# Patient Record
Sex: MALE | Race: WHITE | NOT HISPANIC OR LATINO | Employment: OTHER | URBAN - NONMETROPOLITAN AREA
[De-identification: names, ages, dates, MRNs, and addresses within clinical notes are randomized per-mention and may not be internally consistent; named-entity substitution may affect disease eponyms.]

---

## 2017-01-03 ENCOUNTER — OFFICE VISIT (OUTPATIENT)
Dept: INTERNAL MEDICINE | Facility: CLINIC | Age: 52
End: 2017-01-03

## 2017-01-03 VITALS
WEIGHT: 315 LBS | HEIGHT: 73 IN | DIASTOLIC BLOOD PRESSURE: 80 MMHG | BODY MASS INDEX: 41.75 KG/M2 | HEART RATE: 108 BPM | OXYGEN SATURATION: 98 % | TEMPERATURE: 98.6 F | SYSTOLIC BLOOD PRESSURE: 134 MMHG

## 2017-01-03 DIAGNOSIS — L98.9 SKIN LESION OF CHEST WALL: Primary | ICD-10-CM

## 2017-01-03 PROCEDURE — 99212 OFFICE O/P EST SF 10 MIN: CPT | Performed by: NURSE PRACTITIONER

## 2017-01-03 NOTE — MR AVS SNAPSHOT
Willi Rayo   1/3/2017 2:00 PM   Office Visit    Provider:  KAIT Abarca   Department:  [facility] PRIMARY CARE   Dept Phone:  [phone]                Your Full Care Plan              Today's Medication Changes          These changes are accurate as of: 1/3/17  3:28 PM.  If you have any questions, ask your nurse or doctor.               Stop taking medication(s)listed here:     benzonatate 100 MG capsule   Commonly known as:  TESSALKARON GASTONES                      Your Updated Medication List          This list is accurate as of: 1/3/17  3:28 PM.  Always use your most recent med list.                atorvastatin 40 MG tablet   Commonly known as:  LIPITOR   Take 1 tablet by mouth Daily.       cyclobenzaprine 10 MG tablet   Commonly known as:  FLEXERIL       ibuprofen 800 MG tablet   Commonly known as:  ADVIL,MOTRIN       meloxicam 15 MG tablet   Commonly known as:  MOBIC   Take 1 tablet by mouth Daily.       metoprolol succinate XL 50 MG 24 hr tablet   Commonly known as:  TOPROL-XL   Take 1 tablet by mouth Daily.       NITROSTAT 0.4 MG SL tablet   Generic drug:  nitroglycerin       SUMAtriptan 50 MG tablet   Commonly known as:  IMITREX   Take one tablet at onset of headache. May repeat dose one time in 2 hours if headache not relieved.               We Performed the Following     Ambulatory Referral to General Surgery       You Were Diagnosed With        Codes Comments    Skin lesion of chest wall    -  Primary ICD-10-CM: L98.9  ICD-9-CM: 709.9       Instructions     None    Patient Instructions History      Acc[employer]s Signup     [facility] [employer] allows you to send messages to your doctor, view your test results, renew your prescriptions, schedule appointments, and more. To sign up, go to [employer] and click on the Sign Up Now link in the New User? box. Enter your [employer] Activation Code exactly as it appears below along with the last four  "digits of your Social Security Number and your Date of Birth () to complete the sign-up process. If you do not sign up before the expiration date, you must request a new code.    [employer] Activation Code: [email or web address]  Expires: 2017  3:28 PM    If you have questions, you can email [email or web address] or call [phone] to talk to our [employer] staff. Remember, [employer] is NOT to be used for urgent needs. For medical emergencies, dial 911.               Other Info from Your Visit           Your Appointments     May 09, 2017  9:00 AM EDT   Follow Up with Paul Verduzco MD   [facility] PRIMARY CARE (--)    [address] [place] [address] [phone]           Arrive 15 minutes prior to appointment.              Allergies     Azithromycin      Penicillins      Prednisone        Reason for Visit     spot on chest infected spot on chest x 6 weeks      Vital Signs     Blood Pressure Pulse Temperature Height Weight Oxygen Saturation    134/80 108 98.6 °F (37 °C) 73\" (185.4 cm) 409 lb (186 kg) 98%    Body Mass Index Smoking Status                53.96 kg/m2 Never Smoker          Problems and Diagnoses Noted     Skin lesion of chest wall    -  Primary      "

## 2017-01-03 NOTE — PROGRESS NOTES
"Subjective   Willi Rayo is a 51 y.o. male.     History of Present Illness   Pt has an area on his chest that has been there for 6 weeks.  It started out as a bump.  It then scabbed over.  Every time the scab comes off, it will bleed a lot.  Drainage does not come out of it.  It will get larger and then the scab will come off and it will bleed.  The skin around it will get a little red.  It will get tender.  He denies fever.    Blood pressure 134/80, pulse 108, temperature 98.6 °F (37 °C), height 73\" (185.4 cm), weight (!) 409 lb (186 kg), SpO2 98 %.      The following portions of the patient's history were reviewed and updated as appropriate: allergies, current medications, past medical history and problem list.    Review of Systems  As noted in HPI.    Objective   Physical Exam   Constitutional: He is oriented to person, place, and time. He appears well-developed and well-nourished.   HENT:   Head: Normocephalic and atraumatic.   Neurological: He is alert and oriented to person, place, and time. No cranial nerve deficit.   Skin: Skin is warm and dry.        Psychiatric: He has a normal mood and affect. His behavior is normal. Judgment and thought content normal.   Nursing note and vitals reviewed.      Assessment/Plan   Willi was seen today for spot on chest.    Diagnoses and all orders for this visit:    Skin lesion of chest wall- referral to general surgery for removal.  Pt is agreeable.  No warmth to area.  Does not appear actively infected.  If symptoms worsen or do not improve, notify provider.  -     Ambulatory Referral to General Surgery      F/U PRN.         "

## 2017-01-26 ENCOUNTER — OFFICE VISIT (OUTPATIENT)
Dept: SURGERY | Facility: CLINIC | Age: 52
End: 2017-01-26

## 2017-01-26 VITALS
SYSTOLIC BLOOD PRESSURE: 124 MMHG | HEART RATE: 88 BPM | BODY MASS INDEX: 41.75 KG/M2 | DIASTOLIC BLOOD PRESSURE: 78 MMHG | HEIGHT: 73 IN | OXYGEN SATURATION: 96 % | WEIGHT: 315 LBS | TEMPERATURE: 97.6 F

## 2017-01-26 DIAGNOSIS — L98.9 DISORDER OF SKIN: Primary | ICD-10-CM

## 2017-01-26 PROCEDURE — 99202 OFFICE O/P NEW SF 15 MIN: CPT | Performed by: SURGERY

## 2017-01-26 NOTE — PROGRESS NOTES
LD Reyes MD  New Clinic Visit/ H&P    Willi Rayo        1965 01/26/2017  Paul Verduzco MD    Chief Complaint   Patient presents with   • Cyst     on chest that is tender and becoming larger per 6 weeks. Patient denies drainage and fever        started out as nodule there and keeps getting traumatized.  Will not heal up.  Past Medical History   Diagnosis Date   • Sinusitis      Past Surgical History   Procedure Laterality Date   • Vasectomy     • Mandible fracture closed reduction     • Coronary angioplasty Right      Allergies   Allergen Reactions   • Azithromycin    • Penicillins    • Prednisone      Family History   Problem Relation Age of Onset   • Heart disease Mother    • Heart disease Father    • Hyperlipidemia Father    • Hypertension Father    • Cancer Maternal Grandmother    • Stroke Maternal Grandmother      Social History     Social History   • Marital status:      Spouse name: N/A   • Number of children: N/A   • Years of education: N/A     Occupational History   • Not on file.     Social History Main Topics   • Smoking status: Never Smoker   • Smokeless tobacco: Not on file   • Alcohol use Not on file   • Drug use: Not on file   • Sexual activity: Not on file     Other Topics Concern   • Not on file     Social History Narrative     Review of Systems   Constitutional: Negative.  Unexpected weight change: gain.   HENT: Negative.    Eyes: Positive for redness and visual disturbance.   Respiratory: Negative.    Cardiovascular: Positive for leg swelling.   Gastrointestinal: Negative.    Endocrine: Negative.    Genitourinary: Positive for frequency.   Musculoskeletal: Positive for joint swelling and myalgias.   Skin: Negative.    Allergic/Immunologic: Negative.    Neurological: Negative.    Hematological: Negative.    Psychiatric/Behavioral: Negative.      Vitals:    01/26/17 1007   BP: 124/78   Pulse: 88   Temp: 97.6 °F (36.4 °C)   SpO2: 96%     Physical Exam   There is a 1.5 cm polypoid  nodule that is swollen and excoriated the base is about three fourths of a centimeter.  This appears to be a granuloma at this point.      Procedures     Assessment/Diagnosis  Large granuloma.  Additional:  Plan:    Scheduled for excision in the office.    15 minutes was spent face-to-face with patient counseling and discussing procedures     CORNELIA Reyes MD

## 2017-01-26 NOTE — MR AVS SNAPSHOT
[facility] GENERAL SURGERY  [phone]                    Willi Rayo   1/26/2017 10:30 AM   Office Visit    Dept Phone:  [phone]   Encounter #:  [id number]    Provider:  Phillip Reyes MD   Department:  [facility] GENERAL SURGERY                Your Full Care Plan              Your Updated Medication List          This list is accurate as of: 1/26/17 10:26 AM.  Always use your most recent med list.                atorvastatin 40 MG tablet   Commonly known as:  LIPITOR   Take 1 tablet by mouth Daily.       cyclobenzaprine 10 MG tablet   Commonly known as:  FLEXERIL       ibuprofen 800 MG tablet   Commonly known as:  ADVIL,MOTRIN       meloxicam 15 MG tablet   Commonly known as:  MOBIC   Take 1 tablet by mouth Daily.       metoprolol succinate XL 50 MG 24 hr tablet   Commonly known as:  TOPROL-XL   Take 1 tablet by mouth Daily.       NITROSTAT 0.4 MG SL tablet   Generic drug:  nitroglycerin       SUMAtriptan 50 MG tablet   Commonly known as:  IMITREX   Take one tablet at onset of headache. May repeat dose one time in 2 hours if headache not relieved.               Instructions     None    Patient Instructions History      Upcoming Appointments     Visit Type Date Time Department    NEW PATIENT 1/26/2017 10:30 AM [facility]    IN OFFICE PROCEDURE 1/31/2017 10:30 AM [facility]    FOLLOW UP 5/9/2017  9:00 AM OLYA ZEPEDA      MyChart Signup     Our records indicate that you have declined [facility] [employer] signup. If you would like to sign up for [employer], please email [email or web address] or call [phone] to obtain an activation code.             Other Info from Your Visit           Your Appointments     Jan 26, 2017 10:30 AM EST   New Patient with Phillip Reyes MD   [facility] GENERAL SURGERY (--)    [address] [place] [address] [phone]           Bring all previous medical  "records and films, along with current medications and insurance information.            Jan 31, 2017 10:30 AM EST   IN OFFICE PROCEDURE with Phillip Reyes MD   [facility] GENERAL SURGERY (--)    [address]  [facility] [address] [phone]           Bring medication list, test results, and radiology films that apply.            May 09, 2017  9:00 AM EDT   Follow Up with Paul Verduzco MD   [facility] PRIMARY CARE (--)    [address]  [facility] [address] [phone]           Arrive 15 minutes prior to appointment.              Allergies     Azithromycin      Penicillins      Prednisone        Reason for Visit     Cyst on chest that is tender and becoming larger per 6 weeks. Patient denies drainage and fever      Vital Signs     Blood Pressure Pulse Temperature Height Weight Oxygen Saturation    124/78 88 97.6 °F (36.4 °C) 73\" (185.4 cm) 408 lb (185 kg) 96%    Body Mass Index Smoking Status                53.83 kg/m2 Never Smoker            "

## 2017-01-31 ENCOUNTER — PROCEDURE VISIT (OUTPATIENT)
Dept: SURGERY | Facility: CLINIC | Age: 52
End: 2017-01-31

## 2017-01-31 VITALS
BODY MASS INDEX: 41.75 KG/M2 | HEART RATE: 85 BPM | OXYGEN SATURATION: 98 % | HEIGHT: 73 IN | WEIGHT: 315 LBS | TEMPERATURE: 97.9 F | SYSTOLIC BLOOD PRESSURE: 120 MMHG | DIASTOLIC BLOOD PRESSURE: 60 MMHG

## 2017-01-31 DIAGNOSIS — R22.2 MASS, CHEST: Primary | ICD-10-CM

## 2017-01-31 PROCEDURE — 11402 EXC TR-EXT B9+MARG 1.1-2 CM: CPT | Performed by: SURGERY

## 2017-02-08 ENCOUNTER — OFFICE VISIT (OUTPATIENT)
Dept: SURGERY | Facility: CLINIC | Age: 52
End: 2017-02-08

## 2017-02-08 VITALS
RESPIRATION RATE: 16 BRPM | TEMPERATURE: 98.4 F | DIASTOLIC BLOOD PRESSURE: 80 MMHG | BODY MASS INDEX: 41.75 KG/M2 | WEIGHT: 315 LBS | SYSTOLIC BLOOD PRESSURE: 120 MMHG | HEIGHT: 73 IN | HEART RATE: 83 BPM | OXYGEN SATURATION: 97 %

## 2017-02-08 DIAGNOSIS — Z09 FOLLOW UP: Primary | ICD-10-CM

## 2017-02-08 PROCEDURE — 99024 POSTOP FOLLOW-UP VISIT: CPT | Performed by: SURGERY

## 2017-02-08 NOTE — PROGRESS NOTES
ELISE Reyes MD  Follow-up/Clinic Visit    Willi Rayo        1965 02/08/2017  Paul Verduzco MD    Chief Complaint   Patient presents with   • Post-op Follow-up     s/p removal of cyst from chest wall   • Suture / Staple Removal   Patient had cyst removed from chest wall last visit. He is here today for suture removal and pathology results.     Allergies   Allergen Reactions   • Azithromycin    • Penicillins    • Prednisone      Vitals:    02/08/17 1340   BP: 120/80   Pulse: 83   Resp: 16   Temp: 98.4 °F (36.9 °C)   SpO2: 97%     Physical Exam   The wound is red and reactive and swollen and sutures of been pulled in as though it's been wet and not Covered.  Procedures   Removed sutures.  Stripped.  Care instructions and precautions given.  Assessment/Diagnosis  Pedunculated granuloma    Additional:  Plan:  Care instructions given and return as needed.  15 minutes was spent face-to-face with the patient counseling and discussing procedure on     CORNELIA Reyes MD

## 2017-05-10 ENCOUNTER — OFFICE VISIT (OUTPATIENT)
Dept: INTERNAL MEDICINE | Facility: CLINIC | Age: 52
End: 2017-05-10

## 2017-05-10 VITALS
RESPIRATION RATE: 14 BRPM | WEIGHT: 315 LBS | HEART RATE: 94 BPM | TEMPERATURE: 97.6 F | BODY MASS INDEX: 41.75 KG/M2 | OXYGEN SATURATION: 96 % | DIASTOLIC BLOOD PRESSURE: 82 MMHG | HEIGHT: 73 IN | SYSTOLIC BLOOD PRESSURE: 126 MMHG

## 2017-05-10 DIAGNOSIS — I25.10 CORONARY ARTERY DISEASE INVOLVING NATIVE CORONARY ARTERY WITHOUT ANGINA PECTORIS, UNSPECIFIED WHETHER NATIVE OR TRANSPLANTED HEART: ICD-10-CM

## 2017-05-10 DIAGNOSIS — Z12.11 SCREEN FOR COLON CANCER: ICD-10-CM

## 2017-05-10 DIAGNOSIS — E66.01 MORBID OBESITY, UNSPECIFIED OBESITY TYPE (HCC): ICD-10-CM

## 2017-05-10 DIAGNOSIS — G47.30 SLEEP APNEA, UNSPECIFIED TYPE: ICD-10-CM

## 2017-05-10 DIAGNOSIS — I10 BENIGN ESSENTIAL HYPERTENSION: Primary | ICD-10-CM

## 2017-05-10 PROCEDURE — 99214 OFFICE O/P EST MOD 30 MIN: CPT | Performed by: INTERNAL MEDICINE

## 2017-05-11 RX ORDER — METOPROLOL SUCCINATE 50 MG/1
TABLET, EXTENDED RELEASE ORAL
Qty: 30 TABLET | Refills: 0 | Status: SHIPPED | OUTPATIENT
Start: 2017-05-11 | End: 2017-12-14 | Stop reason: SDUPTHER

## 2017-05-17 ENCOUNTER — RESULTS ENCOUNTER (OUTPATIENT)
Dept: INTERNAL MEDICINE | Facility: CLINIC | Age: 52
End: 2017-05-17

## 2017-05-17 DIAGNOSIS — I25.10 CORONARY ARTERY DISEASE INVOLVING NATIVE CORONARY ARTERY WITHOUT ANGINA PECTORIS, UNSPECIFIED WHETHER NATIVE OR TRANSPLANTED HEART: ICD-10-CM

## 2017-06-19 ENCOUNTER — PREP FOR SURGERY (OUTPATIENT)
Dept: OTHER | Facility: HOSPITAL | Age: 52
End: 2017-06-19

## 2017-06-19 ENCOUNTER — HOSPITAL ENCOUNTER (OUTPATIENT)
Facility: HOSPITAL | Age: 52
Setting detail: HOSPITAL OUTPATIENT SURGERY
End: 2017-06-19
Attending: INTERNAL MEDICINE | Admitting: INTERNAL MEDICINE

## 2017-06-19 ENCOUNTER — OFFICE VISIT (OUTPATIENT)
Dept: GASTROENTEROLOGY | Facility: CLINIC | Age: 52
End: 2017-06-19

## 2017-06-19 VITALS
HEART RATE: 79 BPM | HEIGHT: 73 IN | WEIGHT: 315 LBS | DIASTOLIC BLOOD PRESSURE: 73 MMHG | RESPIRATION RATE: 16 BRPM | SYSTOLIC BLOOD PRESSURE: 119 MMHG | TEMPERATURE: 98 F | BODY MASS INDEX: 41.75 KG/M2

## 2017-06-19 DIAGNOSIS — Z12.11 COLON CANCER SCREENING: Primary | ICD-10-CM

## 2017-06-19 DIAGNOSIS — G47.30 SLEEP APNEA, UNSPECIFIED TYPE: Chronic | ICD-10-CM

## 2017-06-19 PROCEDURE — 99243 OFF/OP CNSLTJ NEW/EST LOW 30: CPT | Performed by: NURSE PRACTITIONER

## 2017-06-19 RX ORDER — SODIUM CHLORIDE 0.9 % (FLUSH) 0.9 %
1-10 SYRINGE (ML) INJECTION AS NEEDED
Status: CANCELLED | OUTPATIENT
Start: 2017-06-19

## 2017-06-19 RX ORDER — SODIUM CHLORIDE 9 MG/ML
70 INJECTION, SOLUTION INTRAVENOUS CONTINUOUS PRN
Status: CANCELLED | OUTPATIENT
Start: 2017-06-19

## 2017-06-19 NOTE — PROGRESS NOTES
"[address] [facility] [place] [address]    Chief Complaint   Patient presents with   • Colon Cancer Screening     History of Present Illness     The patient denies recent change in bowel habits. There is no diarrhea or constipation. There is no history of abdominal pain. There is no history of overt GI bleed (hematemesis melena or hematochezia). The patient denies nausea or vomiting. There is no history of reflux. The patient denies dysphagia or odynophagia. There is no history of recent significant weight loss. There is no history of liver disease in the past. There is no family history of colon cancer. The patient's last colonoscopy was in 2009 and was \"normal\" per patient.    The patient has a history of sleep apnea. He was diagnosed several years ago. He does not use CPAP as he could not get used to the machine.    Review of Systems   Constitutional: Negative for appetite change, chills, fatigue, fever and unexpected weight change.   HENT: Negative for mouth sores, nosebleeds and trouble swallowing.    Eyes: Negative for discharge and redness.   Respiratory: Positive for apnea. Negative for cough and shortness of breath.    Cardiovascular: Negative for chest pain, palpitations and leg swelling.   Gastrointestinal: Negative for abdominal distention, abdominal pain, anal bleeding, blood in stool, constipation, diarrhea, nausea and vomiting.   Endocrine: Negative for cold intolerance, heat intolerance and polydipsia.   Genitourinary: Negative for dysuria, hematuria and urgency.   Musculoskeletal: Positive for arthralgias. Negative for joint swelling and myalgias.   Skin: Negative for rash.   Allergic/Immunologic: Negative for food allergies and immunocompromised state.   Neurological: Negative for dizziness, seizures, syncope and headaches.   Hematological: Negative for adenopathy. Does not bruise/bleed easily.   Psychiatric/Behavioral: Negative for dysphoric mood. The patient is not nervous/anxious and is " not hyperactive.      Patient Active Problem List   Diagnosis   • Benign essential hypertension   • Migraine   • Morbid obesity   • Sleep apnea   • Screen for colon cancer   • Coronary artery disease involving native coronary artery without angina pectoris   • Colon cancer screening     Past Medical History:   Diagnosis Date   • Hyperlipidemia    • Hypertension    • Sinusitis    • Sleep apnea      Past Surgical History:   Procedure Laterality Date   • COLONOSCOPY  2009   • CORONARY ANGIOPLASTY Right    • MANDIBLE FRACTURE CLOSED REDUCTION     • UPPER GASTROINTESTINAL ENDOSCOPY  2009   • VASECTOMY       Family History   Problem Relation Age of Onset   • Heart disease Mother    • Heart disease Father    • Hyperlipidemia Father    • Hypertension Father    • Cancer Maternal Grandmother    • Stroke Maternal Grandmother    • Colon cancer Neg Hx      Social History   Substance Use Topics   • Smoking status: Never Smoker   • Smokeless tobacco: Never Used   • Alcohol use No      Comment: social       Current Outpatient Prescriptions:   •  atorvastatin (LIPITOR) 40 MG tablet, Take 1 tablet by mouth Daily., Disp: 90 tablet, Rfl: 3  •  cyclobenzaprine (FLEXERIL) 10 MG tablet, Take  by mouth., Disp: , Rfl:   •  ibuprofen (ADVIL,MOTRIN) 800 MG tablet, Take  by mouth every 8 (eight) hours., Disp: , Rfl:   •  meloxicam (MOBIC) 15 MG tablet, Take 1 tablet by mouth Daily., Disp: 90 tablet, Rfl: 3  •  metoprolol succinate XL (TOPROL-XL) 50 MG 24 hr tablet, Take 1 tablet by mouth Daily., Disp: 90 tablet, Rfl: 3  •  metoprolol succinate XL (TOPROL-XL) 50 MG 24 hr tablet, TAKE 1 TABLET BY MOUTH DAILY, Disp: 30 tablet, Rfl: 0  •  NITROSTAT 0.4 MG SL tablet, PLACE NOHELIA, Disp: , Rfl: 0  •  SUMAtriptan (IMITREX) 50 MG tablet, Take one tablet at onset of headache. May repeat dose one time in 2 hours if headache not relieved., Disp: 15 tablet, Rfl: 1    Allergies   Allergen Reactions   • Amoxicillin    • Azithromycin    • Penicillins    •  "Prednisone      /73  Pulse 79  Temp 98 °F (36.7 °C)  Resp 16  Ht 73\" (185.4 cm)  Wt (!) 416 lb (189 kg)  BMI 54.88 kg/m2    Physical Exam   Constitutional: He is oriented to person, place, and time. He appears well-developed and well-nourished. No distress.   HENT:   Head: Normocephalic and atraumatic.   Right Ear: Hearing and external ear normal.   Left Ear: Hearing and external ear normal.   Nose: Nose normal.   Mouth/Throat: Oropharynx is clear and moist and mucous membranes are normal. Mucous membranes are not pale, not dry and not cyanotic. No oral lesions. No oropharyngeal exudate.   Eyes: Conjunctivae and EOM are normal. Right eye exhibits no discharge. Left eye exhibits no discharge.   Neck: Trachea normal. Neck supple. No JVD present. No edema present. No thyroid mass and no thyromegaly present.   Cardiovascular: Normal rate, regular rhythm, S2 normal and normal heart sounds.  Exam reveals no gallop, no S3 and no friction rub.    No murmur heard.  Pulmonary/Chest: Effort normal and breath sounds normal. No respiratory distress. He exhibits no tenderness.   Abdominal: Normal appearance and bowel sounds are normal. He exhibits no distension, no ascites and no mass. There is no splenomegaly or hepatomegaly. There is no tenderness. There is no rigidity, no rebound and no guarding. No hernia.       Vascular Status -  His exam exhibits no right foot edema. His exam exhibits no left foot edema.  Lymphadenopathy:     He has no cervical adenopathy.        Left: No supraclavicular adenopathy present.   Neurological: He is alert and oriented to person, place, and time. He has normal strength. No cranial nerve deficit or sensory deficit.   Skin: No rash noted. He is not diaphoretic. No cyanosis. No pallor. Nails show no clubbing.   Psychiatric: He has a normal mood and affect.   Nursing note and vitals reviewed.  Stigmata of chronic liver disease:  None.  Asterixis:  None.    Laboratory Results:  Upon " review of records:    Dated 11/8/2016 glucose 86 BUN 15 creatinine 0.9 sodium 142 potassium 4.7 chloride 106 CO2 27 calcium 9.2 albumin 4.0 ALT 26 AST 20 alkaline phosphatase 91 total bilirubin 0.8 vitamin D 33.7 PSA 1.1     Assessment and Plan:      Willi was seen today for colon cancer screening.    Diagnoses and all orders for this visit:    Colon cancer screening  Comments:  Last colonoscopy was in 2009. No family history of colon cancer.    Sleep apnea, unspecified type  Comments:  History of long-standing sleep apnea. The patient does not use CPAP machine for sleep.        Plan  and Patient Instructions:  Patient Instructions   1. Colonoscopy: Description of the procedure, risks, benefits, alternatives and options, including nonoperative options, were discussed with the patient in detail. The patient understands and wishes to proceed.    Patient Care Team:  Paul Verduzco MD as PCP - General  Paul Verduzco MD as PCP - Family Medicine    KAIT Ren

## 2017-07-28 DIAGNOSIS — R74.8 ABNORMAL LIVER ENZYMES: Primary | ICD-10-CM

## 2017-07-28 LAB
ALBUMIN SERPL-MCNC: 4.2 G/DL (ref 3.5–5)
ALBUMIN/GLOB SERPL: 1.4 G/DL (ref 1–2)
ALP SERPL-CCNC: 82 U/L (ref 38–126)
ALT SERPL-CCNC: 91 U/L (ref 13–69)
AST SERPL-CCNC: 50 U/L (ref 15–46)
BILIRUB SERPL-MCNC: 0.9 MG/DL (ref 0.2–1.3)
BUN SERPL-MCNC: 19 MG/DL (ref 7–20)
BUN/CREAT SERPL: 17.3 (ref 6.3–21.9)
CALCIUM SERPL-MCNC: 9.7 MG/DL (ref 8.4–10.2)
CHLORIDE SERPL-SCNC: 102 MMOL/L (ref 98–107)
CHOLEST SERPL-MCNC: 102 MG/DL (ref 0–199)
CK SERPL-CCNC: 142 U/L (ref 30–170)
CO2 SERPL-SCNC: 26 MMOL/L (ref 26–30)
CREAT SERPL-MCNC: 1.1 MG/DL (ref 0.6–1.3)
GLOBULIN SER CALC-MCNC: 3 GM/DL
GLUCOSE SERPL-MCNC: 80 MG/DL (ref 74–98)
HDLC SERPL-MCNC: 27 MG/DL (ref 40–60)
LDLC SERPL CALC-MCNC: 62 MG/DL (ref 0–99)
POTASSIUM SERPL-SCNC: 4.4 MMOL/L (ref 3.5–5.1)
PROT SERPL-MCNC: 7.2 G/DL (ref 6.3–8.2)
SODIUM SERPL-SCNC: 141 MMOL/L (ref 137–145)
TRIGL SERPL-MCNC: 67 MG/DL
VLDLC SERPL CALC-MCNC: 13.4 MG/DL

## 2017-08-02 ENCOUNTER — RESULTS ENCOUNTER (OUTPATIENT)
Dept: INTERNAL MEDICINE | Facility: CLINIC | Age: 52
End: 2017-08-02

## 2017-08-02 DIAGNOSIS — R74.8 ABNORMAL LIVER ENZYMES: ICD-10-CM

## 2017-12-04 RX ORDER — ATORVASTATIN CALCIUM 40 MG/1
TABLET, FILM COATED ORAL
Qty: 90 TABLET | Refills: 0 | Status: SHIPPED | OUTPATIENT
Start: 2017-12-04 | End: 2018-03-04 | Stop reason: SDUPTHER

## 2017-12-15 RX ORDER — METOPROLOL SUCCINATE 50 MG/1
TABLET, EXTENDED RELEASE ORAL
Qty: 30 TABLET | Refills: 5 | Status: SHIPPED | OUTPATIENT
Start: 2017-12-15 | End: 2018-01-26

## 2018-01-26 ENCOUNTER — OFFICE VISIT (OUTPATIENT)
Dept: INTERNAL MEDICINE | Facility: CLINIC | Age: 53
End: 2018-01-26

## 2018-01-26 VITALS
DIASTOLIC BLOOD PRESSURE: 80 MMHG | HEART RATE: 91 BPM | BODY MASS INDEX: 41.75 KG/M2 | TEMPERATURE: 98.6 F | SYSTOLIC BLOOD PRESSURE: 110 MMHG | OXYGEN SATURATION: 99 % | HEIGHT: 73 IN | WEIGHT: 315 LBS

## 2018-01-26 DIAGNOSIS — M79.672 LEFT FOOT PAIN: Primary | ICD-10-CM

## 2018-01-26 DIAGNOSIS — M10.9 ACUTE GOUT OF LEFT FOOT, UNSPECIFIED CAUSE: ICD-10-CM

## 2018-01-26 PROBLEM — E79.0 ELEVATED BLOOD URIC ACID LEVEL: Status: ACTIVE | Noted: 2018-01-26

## 2018-01-26 PROCEDURE — 99214 OFFICE O/P EST MOD 30 MIN: CPT | Performed by: PHYSICIAN ASSISTANT

## 2018-01-26 RX ORDER — SUMATRIPTAN 50 MG/1
50 TABLET, FILM COATED ORAL ONCE AS NEEDED
Qty: 15 TABLET | Refills: 1 | Status: SHIPPED | OUTPATIENT
Start: 2018-01-26 | End: 2018-10-05 | Stop reason: SDUPTHER

## 2018-01-26 RX ORDER — IBUPROFEN 800 MG/1
800 TABLET ORAL EVERY 8 HOURS
Qty: 20 TABLET | Refills: 1 | Status: SHIPPED | OUTPATIENT
Start: 2018-01-26 | End: 2018-04-29

## 2018-01-26 NOTE — PROGRESS NOTES
Willi Rayo is a 52 y.o. male.     Subjective   History of Present Illness   Here today for follow up from ED visit last week (likely 1/15) due to left distal foot pain at the base of the 2nd-4th toes along with some redness.  Pain began the day he went to the ED and rapidly worsened. He took ibuprofen and Flexeril but pain did not improve so he went to [facility] ED where he had labs and x-ray. He notes that the pain improved some with putting his shoe on due to the pressure.   They told him that his uric acid level was slightly elevated.  He was given one dose of Vicodin and sent home. The next day the pain was considerably better but still sore to walk on.  The foot is still a little sore but much better than at onset.  No previous similar occurrences.  No warmth or edema at any time.  He has increased protein intake but no real increase in red meat intake. He has read meat about 1-2 times per week. He only consumes alcohol occasionally.  He has lost 26 pounds since his last office visit by decreasing carb intake, not eating past 6 pm and decreasing sugar intake.         The following portions of the patient's history were reviewed and updated as appropriate: allergies, current medications, past family history, past medical history, past social history, past surgical history and problem list.    Review of Systems    Constitutional: intentional weight loss. Negative for appetite change, chills, fatigue, fever and unexpected weight change.   HENT: Negative for congestion, ear pain, hearing loss, nosebleeds, postnasal drip, rhinorrhea, sore throat, tinnitus and trouble swallowing.    Eyes: Negative for photophobia, discharge and visual disturbance.   Respiratory: Negative for cough, chest tightness, shortness of breath and wheezing.    Cardiovascular: Negative for chest pain, palpitations and leg swelling.   Gastrointestinal: Negative for abdominal distention, abdominal pain, blood in stool, constipation,  "diarrhea, nausea and vomiting.   Endocrine: Negative for cold intolerance, heat intolerance, polydipsia, polyphagia and polyuria.   Musculoskeletal: left foot pain. Negative for back pain, joint swelling, myalgias, neck pain and neck stiffness.   Skin: left foot redness. Negative for pallor, rash and wound.   Allergic/Immunologic: Negative for environmental allergies, food allergies and immunocompromised state.   Neurological: Negative for dizziness, tremors, seizures, weakness, numbness and headaches.   Hematological: Negative for adenopathy. Does not bruise/bleed easily.   Psychiatric/Behavioral: Negative for sleep disturbances, agitation, behavioral problems, confusion, hallucinations, self-injury and suicidal ideas. The patient is not nervous/anxious.      Objective    Physical Exam  Constitutional: Oriented to person, place, and time. Appears well-developed and well-nourished.   HENT:   Head: Normocephalic and atraumatic.   Eyes: EOM are normal. Pupils are equal, round, and reactive to light.   Neck: Normal range of motion. Neck supple.   Cardiovascular: Normal rate, regular rhythm and normal heart sounds.    Pulmonary/Chest: Effort normal and breath sounds normal. No respiratory distress.  Has no wheezes or rales. Exhibits no chest wall tenderness.   Abdominal: Soft. Bowel sounds are normal. Exhibits no distension and no mass. There is no tenderness.   Musculoskeletal: Normal range of motion. Exhibits no tenderness.   Neurological: Alert and oriented to person, place, and time.   Skin: no erythema. Skin is warm and dry.   Psychiatric: Has a normal mood and affect. Behavior is normal. Judgment and thought content normal.       /80  Pulse 91  Temp 98.6 °F (37 °C)  Ht 185.4 cm (72.99\")  Wt (!) 177 kg (390 lb)  SpO2 99%  BMI 51.47 kg/m2    Nursing note and vitals reviewed.        Assessment/Plan   Willi was seen today for foot pain.    Diagnoses and all orders for this visit:    Left foot pain  -     " ibuprofen (ADVIL,MOTRIN) 800 MG tablet; Take 1 tablet by mouth Every 8 (Eight) Hours.    Gout left foot, acute  Improved since onset last week.   Likely gout despite abnormal area for pain with first occurrence of gout.   Uric Acid level elevated at 7.7 in ED.  ED record from [facility] reviewed.     Will not initiate treatment with Allopurinol since there have been no previous similar occurrences. Discussed dietary changes necessary to minimize chance of repeat episodes which he voiced understanding of.

## 2018-03-05 RX ORDER — ATORVASTATIN CALCIUM 40 MG/1
TABLET, FILM COATED ORAL
Qty: 90 TABLET | Refills: 0 | Status: SHIPPED | OUTPATIENT
Start: 2018-03-05 | End: 2018-06-11 | Stop reason: SDUPTHER

## 2018-04-29 ENCOUNTER — HOSPITAL ENCOUNTER (EMERGENCY)
Facility: HOSPITAL | Age: 53
Discharge: HOME | End: 2018-04-29
Attending: EMERGENCY MEDICINE | Admitting: EMERGENCY MEDICINE

## 2018-04-29 ENCOUNTER — APPOINTMENT (OUTPATIENT)
Dept: GENERAL RADIOLOGY | Facility: HOSPITAL | Age: 53
End: 2018-04-29

## 2018-04-29 VITALS
SYSTOLIC BLOOD PRESSURE: 128 MMHG | WEIGHT: 315 LBS | RESPIRATION RATE: 18 BRPM | DIASTOLIC BLOOD PRESSURE: 75 MMHG | BODY MASS INDEX: 41.75 KG/M2 | OXYGEN SATURATION: 97 % | HEART RATE: 95 BPM | TEMPERATURE: 98.6 F | HEIGHT: 73 IN

## 2018-04-29 DIAGNOSIS — S61.210A LACERATION OF RIGHT INDEX FINGER WITHOUT FOREIGN BODY WITHOUT DAMAGE TO NAIL, INITIAL ENCOUNTER: Primary | ICD-10-CM

## 2018-04-29 PROCEDURE — 90471 IMMUNIZATION ADMIN: CPT | Performed by: EMERGENCY MEDICINE

## 2018-04-29 PROCEDURE — 25010000002 TDAP 5-2.5-18.5 LF-MCG/0.5 SUSPENSION: Performed by: EMERGENCY MEDICINE

## 2018-04-29 PROCEDURE — 90715 TDAP VACCINE 7 YRS/> IM: CPT | Performed by: EMERGENCY MEDICINE

## 2018-04-29 PROCEDURE — 99283 EMERGENCY DEPT VISIT LOW MDM: CPT

## 2018-04-29 PROCEDURE — 73140 X-RAY EXAM OF FINGER(S): CPT

## 2018-04-29 RX ORDER — SULFAMETHOXAZOLE AND TRIMETHOPRIM 800; 160 MG/1; MG/1
1 TABLET ORAL 2 TIMES DAILY
Qty: 10 TABLET | Refills: 0 | Status: SHIPPED | OUTPATIENT
Start: 2018-04-29 | End: 2018-05-04

## 2018-04-29 RX ORDER — LIDOCAINE HYDROCHLORIDE 10 MG/ML
10 INJECTION, SOLUTION INFILTRATION; PERINEURAL ONCE
Status: DISCONTINUED | OUTPATIENT
Start: 2018-04-29 | End: 2018-04-29 | Stop reason: HOSPADM

## 2018-04-29 RX ORDER — BACITRACIN ZINC 500 [USP'U]/G
OINTMENT TOPICAL ONCE
Status: COMPLETED | OUTPATIENT
Start: 2018-04-29 | End: 2018-04-29

## 2018-04-29 RX ADMIN — TETANUS TOXOID, REDUCED DIPHTHERIA TOXOID AND ACELLULAR PERTUSSIS VACCINE, ADSORBED 0.5 ML: 5; 2.5; 8; 8; 2.5 SUSPENSION INTRAMUSCULAR at 18:34

## 2018-04-29 RX ADMIN — BACITRACIN ZINC: 500 OINTMENT TOPICAL at 19:36

## 2018-05-04 ENCOUNTER — OFFICE VISIT (OUTPATIENT)
Dept: INTERNAL MEDICINE | Facility: CLINIC | Age: 53
End: 2018-05-04

## 2018-05-04 VITALS
TEMPERATURE: 97.6 F | HEIGHT: 73 IN | OXYGEN SATURATION: 98 % | WEIGHT: 315 LBS | SYSTOLIC BLOOD PRESSURE: 130 MMHG | HEART RATE: 90 BPM | BODY MASS INDEX: 41.75 KG/M2 | RESPIRATION RATE: 14 BRPM | DIASTOLIC BLOOD PRESSURE: 80 MMHG

## 2018-05-04 DIAGNOSIS — S61.310A LACERATION OF RIGHT INDEX FINGER WITH DAMAGE TO NAIL, FOREIGN BODY PRESENCE UNSPECIFIED, INITIAL ENCOUNTER: Primary | ICD-10-CM

## 2018-05-04 PROCEDURE — 99213 OFFICE O/P EST LOW 20 MIN: CPT | Performed by: INTERNAL MEDICINE

## 2018-05-04 NOTE — PROGRESS NOTES
Subjective   Willi Rayo is a 52 y.o. male.     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 Still pain with certain position and sharp pain with touching. No fever or chills. Patient is also on Abx. Unable to work and need FMLA paper to be filled    Current Outpatient Prescriptions:   •  atorvastatin (LIPITOR) 40 MG tablet, TAKE 1 TABLET BY MOUTH DAILY, Disp: 90 tablet, Rfl: 0  •  meloxicam (MOBIC) 15 MG tablet, Take 1 tablet by mouth Daily., Disp: 90 tablet, Rfl: 3  •  metoprolol succinate XL (TOPROL-XL) 50 MG 24 hr tablet, Take 1 tablet by mouth Daily., Disp: 90 tablet, Rfl: 3  •  NITROSTAT 0.4 MG SL tablet, PLACE NOHELIA, Disp: , Rfl: 0  •  SUMAtriptan (IMITREX) 50 MG tablet, Take 1 tablet by mouth 1 (One) Time As Needed for Migraine for up to 1 dose., Disp: 15 tablet, Rfl: 1    The following portions of the patient's history were reviewed and updated as appropriate: allergies, current medications, past family history, past medical history, past social history, past surgical history and problem list.    Review of Systems   Constitutional: Negative.    Respiratory: Negative.    Cardiovascular: Negative.    Gastrointestinal: Negative.    Musculoskeletal: Negative.    Skin: Positive for wound.   Neurological: Negative.    Psychiatric/Behavioral: Negative.        Objective   Physical Exam   Constitutional: He is oriented to person, place, and time. He appears well-nourished.   Neck: Neck supple.   Cardiovascular: Normal rate, regular rhythm and normal heart sounds.    Pulmonary/Chest: Effort normal and breath sounds normal.   Abdominal: Bowel sounds are normal.   Neurological: He is alert and oriented to person, place, and time.   Skin: Skin is warm.   Right index finger stitches without overt swelling or erythema    Psychiatric:  He has a normal mood and affect.       All tests have been reviewed.    Assessment/Plan   Diagnoses and all orders for this visit:    Laceration of right index finger with damage to nail, foreign body presence unspecified, initial encounter             remova stitches in 12 days after suture. Continue present Mx.   Filled FMLA paper

## 2018-05-10 ENCOUNTER — CLINICAL SUPPORT (OUTPATIENT)
Dept: INTERNAL MEDICINE | Facility: CLINIC | Age: 53
End: 2018-05-10

## 2018-05-10 NOTE — PROGRESS NOTES
Patient came in to has stitches removed from his right index finger. Patient had 10 stitches removed.

## 2018-06-11 RX ORDER — ATORVASTATIN CALCIUM 40 MG/1
TABLET, FILM COATED ORAL
Qty: 90 TABLET | Refills: 1 | Status: SHIPPED | OUTPATIENT
Start: 2018-06-11 | End: 2019-01-02 | Stop reason: SDUPTHER

## 2018-06-28 RX ORDER — METOPROLOL SUCCINATE 50 MG/1
TABLET, EXTENDED RELEASE ORAL
Qty: 30 TABLET | Refills: 0 | Status: SHIPPED | OUTPATIENT
Start: 2018-06-28 | End: 2018-07-28 | Stop reason: SDUPTHER

## 2018-07-30 RX ORDER — METOPROLOL SUCCINATE 50 MG/1
TABLET, EXTENDED RELEASE ORAL
Qty: 30 TABLET | Refills: 0 | Status: SHIPPED | OUTPATIENT
Start: 2018-07-30 | End: 2018-08-30 | Stop reason: SDUPTHER

## 2018-08-30 RX ORDER — METOPROLOL SUCCINATE 50 MG/1
TABLET, EXTENDED RELEASE ORAL
Qty: 30 TABLET | Refills: 0 | Status: SHIPPED | OUTPATIENT
Start: 2018-08-30 | End: 2018-10-03 | Stop reason: SDUPTHER

## 2018-10-03 RX ORDER — METOPROLOL SUCCINATE 50 MG/1
TABLET, EXTENDED RELEASE ORAL
Qty: 30 TABLET | Refills: 1 | Status: SHIPPED | OUTPATIENT
Start: 2018-10-03 | End: 2018-10-05

## 2018-10-05 ENCOUNTER — OFFICE VISIT (OUTPATIENT)
Dept: INTERNAL MEDICINE | Facility: CLINIC | Age: 53
End: 2018-10-05

## 2018-10-05 VITALS
HEART RATE: 86 BPM | WEIGHT: 315 LBS | BODY MASS INDEX: 41.75 KG/M2 | HEIGHT: 73 IN | OXYGEN SATURATION: 95 % | DIASTOLIC BLOOD PRESSURE: 70 MMHG | SYSTOLIC BLOOD PRESSURE: 108 MMHG

## 2018-10-05 DIAGNOSIS — J06.9 ACUTE URI: Primary | ICD-10-CM

## 2018-10-05 PROCEDURE — 99213 OFFICE O/P EST LOW 20 MIN: CPT | Performed by: INTERNAL MEDICINE

## 2018-10-05 RX ORDER — IBUPROFEN 800 MG/1
TABLET ORAL
Refills: 1 | COMMUNITY
Start: 2018-07-01 | End: 2018-10-05 | Stop reason: SDUPTHER

## 2018-10-05 RX ORDER — ALBUTEROL SULFATE 90 UG/1
2 AEROSOL, METERED RESPIRATORY (INHALATION) EVERY 6 HOURS PRN
Qty: 1 INHALER | Refills: 1 | Status: SHIPPED | OUTPATIENT
Start: 2018-10-05 | End: 2019-07-02

## 2018-10-05 RX ORDER — IBUPROFEN 800 MG/1
800 TABLET ORAL EVERY 8 HOURS PRN
Qty: 90 TABLET | Refills: 5 | Status: SHIPPED | OUTPATIENT
Start: 2018-10-05 | End: 2019-01-04

## 2018-10-05 RX ORDER — SUMATRIPTAN 50 MG/1
50 TABLET, FILM COATED ORAL ONCE AS NEEDED
Qty: 15 TABLET | Refills: 1 | Status: SHIPPED | OUTPATIENT
Start: 2018-10-05 | End: 2021-11-19 | Stop reason: SDUPTHER

## 2018-10-05 RX ORDER — DOXYCYCLINE HYCLATE 100 MG/1
100 CAPSULE ORAL EVERY 12 HOURS SCHEDULED
Qty: 28 CAPSULE | Refills: 0 | Status: SHIPPED | OUTPATIENT
Start: 2018-10-05 | End: 2019-07-02

## 2018-10-05 NOTE — PROGRESS NOTES
Subjective   Willi Rayo is a 53 y.o. male.     53-year-old male patient presents to clinic today for cough that began 2.5 weeks ago.  Patient also mentions congestion and postnasal drip that began at the same time.  Patient says he feels a tickle in his throat and this causes him to cough  Patient also says he has coughing spells that last from 20-30 seconds to almost make him feel like he is going to pass out.  Patient denies shortness of breath.  Patient says over the past 2-1/2 weeks the cough has been slightly improving but the cough is worse at night, even waking him up during the night.  Patient says he doesn't cough much up.  Patient says he does have seasonal allergies but no allergy medication has helped him with his allergies.  Patient says he had some Tessalon Perles to help his cough but he is currently out of those.  Patient denies sore throat dysphasia nausea vomiting diarrhea.  Patient denies apneic episodes.  Patient denies smoking.      Cough   Associated symptoms include postnasal drip. Pertinent negatives include no chest pain, chills, fever, rhinorrhea, sore throat, shortness of breath or wheezing.        The following portions of the patient's history were reviewed and updated as appropriate: allergies, current medications, past family history, past medical history, past social history, past surgical history and problem list.    Review of Systems   Constitutional: Negative for chills, diaphoresis, fatigue and fever.   HENT: Positive for postnasal drip. Negative for rhinorrhea, sinus pain, sinus pressure, sneezing, sore throat and trouble swallowing.    Respiratory: Positive for cough. Negative for apnea, shortness of breath and wheezing.    Cardiovascular: Negative for chest pain.   Gastrointestinal: Negative for abdominal pain, anal bleeding, diarrhea, nausea and vomiting.   Genitourinary: Negative.        Objective   Physical Exam   Constitutional: He is oriented to person, place, and  time. He appears well-developed and well-nourished.   HENT:   Head: Normocephalic and atraumatic.   Cardiovascular: Normal rate, regular rhythm, normal heart sounds and intact distal pulses.    Pulmonary/Chest: Effort normal and breath sounds normal. No respiratory distress. He has no wheezes. He has no rales. He exhibits no tenderness.   Neurological: He is alert and oriented to person, place, and time.   Skin: Skin is warm.   Psychiatric: He has a normal mood and affect. His behavior is normal.       Assessment/Plan   Willi was seen today for cough.    Diagnoses and all orders for this visit:    Acute URI    Other orders  -     ibuprofen (ADVIL,MOTRIN) 800 MG tablet; Take 1 tablet by mouth Every 8 (Eight) Hours As Needed for Mild Pain .  -     SUMAtriptan (IMITREX) 50 MG tablet; Take 1 tablet by mouth 1 (One) Time As Needed for Migraine for up to 1 dose.  -     doxycycline (VIBRAMYCIN) 100 MG capsule; Take 1 capsule by mouth Every 12 (Twelve) Hours.  -     albuterol (PROVENTIL HFA;VENTOLIN HFA) 108 (90 Base) MCG/ACT inhaler; Inhale 2 puffs Every 6 (Six) Hours As Needed (cough).

## 2018-12-10 RX ORDER — METOPROLOL SUCCINATE 50 MG/1
TABLET, EXTENDED RELEASE ORAL
Qty: 30 TABLET | Refills: 0 | Status: SHIPPED | OUTPATIENT
Start: 2018-12-10 | End: 2019-01-04 | Stop reason: SDUPTHER

## 2019-01-02 RX ORDER — ATORVASTATIN CALCIUM 40 MG/1
TABLET, FILM COATED ORAL
Qty: 90 TABLET | Refills: 0 | Status: SHIPPED | OUTPATIENT
Start: 2019-01-02 | End: 2019-01-04 | Stop reason: SDUPTHER

## 2019-01-04 ENCOUNTER — OFFICE VISIT (OUTPATIENT)
Dept: INTERNAL MEDICINE | Facility: CLINIC | Age: 54
End: 2019-01-04

## 2019-01-04 VITALS
DIASTOLIC BLOOD PRESSURE: 84 MMHG | WEIGHT: 315 LBS | HEIGHT: 73 IN | TEMPERATURE: 97.6 F | BODY MASS INDEX: 41.75 KG/M2 | SYSTOLIC BLOOD PRESSURE: 156 MMHG | OXYGEN SATURATION: 98 % | HEART RATE: 96 BPM

## 2019-01-04 DIAGNOSIS — I25.10 CORONARY ARTERY DISEASE INVOLVING NATIVE CORONARY ARTERY WITHOUT ANGINA PECTORIS, UNSPECIFIED WHETHER NATIVE OR TRANSPLANTED HEART: ICD-10-CM

## 2019-01-04 DIAGNOSIS — M54.6 ACUTE RIGHT-SIDED THORACIC BACK PAIN: Primary | ICD-10-CM

## 2019-01-04 DIAGNOSIS — I10 BENIGN ESSENTIAL HYPERTENSION: ICD-10-CM

## 2019-01-04 DIAGNOSIS — M62.830 SPASM OF THORACIC BACK MUSCLE: ICD-10-CM

## 2019-01-04 PROCEDURE — 99214 OFFICE O/P EST MOD 30 MIN: CPT | Performed by: PHYSICIAN ASSISTANT

## 2019-01-04 RX ORDER — ATORVASTATIN CALCIUM 40 MG/1
40 TABLET, FILM COATED ORAL DAILY
Qty: 90 TABLET | Refills: 1 | Status: SHIPPED | OUTPATIENT
Start: 2019-01-04 | End: 2019-10-28 | Stop reason: SDUPTHER

## 2019-01-04 RX ORDER — TIZANIDINE HYDROCHLORIDE 4 MG/1
4 CAPSULE, GELATIN COATED ORAL 3 TIMES DAILY
Qty: 30 CAPSULE | Refills: 0 | Status: SHIPPED | OUTPATIENT
Start: 2019-01-04 | End: 2019-07-02

## 2019-01-04 RX ORDER — METOPROLOL SUCCINATE 50 MG/1
50 TABLET, EXTENDED RELEASE ORAL DAILY
Qty: 90 TABLET | Refills: 1 | Status: SHIPPED | OUTPATIENT
Start: 2019-01-04 | End: 2019-07-15 | Stop reason: SDUPTHER

## 2019-01-04 NOTE — PROGRESS NOTES
Subjective     Chief Complaint: back pain    History of Present Illness     Willi Rayo is a 53 y.o. male presenting following injuring his back last night moving a box. States he has had spasms similar to this in the past.  He took a baclofen tablet last night and received some benefit.  He has not tried any anti-inflammatories.  He denies any pain to lower back or radiating down legs, denies any weakness, numbness tingling or burning.  Denies any issues with bowel or bladder or saddle anesthesia.  Patient notes decreased range of motion, has a hard time bending over and standing back up.  He feels better if sitting still, then the pain is only a 2 or 3 out of 10.  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  Unsure if he will be able to work tomorrow due to the nature of his job and how much pain he is in.    Also requesting refills on his Lipitor and metoprolol.  BP is elevated today though likely due to patient's pain. Did take his medication this morning. He will monitor his BP at home. Has been running fine prior to hurting back. Denies any headache, CP, SOA, palpitations.    The following portions of the patient's history were reviewed and updated as appropriate: current medications, allergies, PMH.    Review of Systems   Constitutional: Negative for appetite change, chills, fatigue, fever and unexpected weight change.   HENT: Negative for congestion, ear pain, hearing loss, nosebleeds, sinus pressure, sore throat, tinnitus and trouble swallowing.    Eyes: Negative for photophobia, discharge and visual disturbance.   Respiratory: Negative for cough, chest tightness, shortness of breath and wheezing.    Cardiovascular: Negative for chest pain, palpitations and leg swelling.   Gastrointestinal: Negative for abdominal distention, abdominal pain, blood in stool, constipation, diarrhea, nausea and vomiting.   Endocrine: Negative for cold intolerance, heat  "intolerance, polydipsia, polyphagia and polyuria.   Genitourinary: Negative for difficulty urinating, dysuria, flank pain, frequency, hematuria and urgency.   Musculoskeletal: Positive for back pain. Negative for arthralgias, joint swelling, myalgias, neck pain and neck stiffness.   Skin: Negative for color change, pallor, rash and wound.   Allergic/Immunologic: Negative for environmental allergies, food allergies and immunocompromised state.   Neurological: Negative for dizziness, weakness, numbness and headaches.   Hematological: Negative for adenopathy. Does not bruise/bleed easily.   Psychiatric/Behavioral: Negative for dysphoric mood, hallucinations, sleep disturbance and suicidal ideas. The patient is not nervous/anxious.        Objective     Vitals:    01/04/19 1301 01/04/19 1403   BP: (!) 168/104 156/84   Pulse: 96    Temp: 97.6 °F (36.4 °C)    SpO2: 98%    Weight: (!) 193 kg (426 lb)    Height: 185.4 cm (72.99\")      Physical Exam   Constitutional: He is oriented to person, place, and time. He appears well-developed and well-nourished.   HENT:   Right Ear: Tympanic membrane normal.   Left Ear: Tympanic membrane normal.   Neck: Normal range of motion. Neck supple.   Cardiovascular: Normal rate and regular rhythm.   Pulmonary/Chest: Effort normal and breath sounds normal.   Abdominal: There is no CVA tenderness.   Musculoskeletal:        Thoracic back: He exhibits decreased range of motion, tenderness and spasm.        Lumbar back: He exhibits decreased range of motion, tenderness and spasm.   Neurological: He is alert and oriented to person, place, and time.   Psychiatric: He has a normal mood and affect.         Assessment/Plan     Diagnoses and all orders for this visit:    Acute right-sided thoracic back pain  -     TiZANidine (ZANAFLEX) 4 MG capsule; Take 1 capsule by mouth 3 (Three) Times a Day.  -     diclofenac (VOLTAREN) 50 MG EC tablet; Take 1 tablet by mouth 2 (Two) Times a Day As Needed " (pain).    Spasm of thoracic back muscle  -     TiZANidine (ZANAFLEX) 4 MG capsule; Take 1 capsule by mouth 3 (Three) Times a Day.  -     diclofenac (VOLTAREN) 50 MG EC tablet; Take 1 tablet by mouth 2 (Two) Times a Day As Needed (pain).    Heat TID. May supplement with tylenol. Consider PT if issue persists.    Benign essential hypertension  -     metoprolol succinate XL (TOPROL-XL) 50 MG 24 hr tablet; Take 1 tablet by mouth Daily.    Requested refill.  Monitor BP at home. Has been running okay but elevated today likely due to pain.    Coronary artery disease involving native coronary artery without angina pectoris, unspecified whether native or transplanted heart  -     atorvastatin (LIPITOR) 40 MG tablet; Take 1 tablet by mouth Daily.    Follow up with Dr. Verduzco as scheduled.    Kari Teresa PA-C  01/04/2019         Please note that portions of this note were completed with a voice recognition program. Efforts were made to edit dictation, but occasionally words are mistranscribed.

## 2019-01-10 RX ORDER — METOPROLOL SUCCINATE 50 MG/1
TABLET, EXTENDED RELEASE ORAL
Qty: 30 TABLET | Refills: 0 | OUTPATIENT
Start: 2019-01-10

## 2019-06-19 ENCOUNTER — TRANSCRIBE ORDERS (OUTPATIENT)
Dept: ADMINISTRATIVE | Facility: HOSPITAL | Age: 54
End: 2019-06-19

## 2019-06-19 DIAGNOSIS — S83.281A ACUTE LATERAL MENISCUS TEAR OF RIGHT KNEE, INITIAL ENCOUNTER: Primary | ICD-10-CM

## 2019-07-02 ENCOUNTER — OFFICE VISIT (OUTPATIENT)
Dept: INTERNAL MEDICINE | Facility: CLINIC | Age: 54
End: 2019-07-02

## 2019-07-02 ENCOUNTER — HOSPITAL ENCOUNTER (OUTPATIENT)
Dept: MRI IMAGING | Facility: HOSPITAL | Age: 54
Discharge: HOME | End: 2019-07-02
Admitting: ORTHOPAEDIC SURGERY

## 2019-07-02 VITALS
SYSTOLIC BLOOD PRESSURE: 130 MMHG | HEART RATE: 87 BPM | BODY MASS INDEX: 41.75 KG/M2 | WEIGHT: 315 LBS | OXYGEN SATURATION: 96 % | DIASTOLIC BLOOD PRESSURE: 70 MMHG | HEIGHT: 73 IN | TEMPERATURE: 98.7 F

## 2019-07-02 DIAGNOSIS — R11.2 NON-INTRACTABLE VOMITING WITH NAUSEA, UNSPECIFIED VOMITING TYPE: ICD-10-CM

## 2019-07-02 DIAGNOSIS — K59.00 CONSTIPATION, UNSPECIFIED CONSTIPATION TYPE: ICD-10-CM

## 2019-07-02 DIAGNOSIS — Z12.11 COLON CANCER SCREENING: ICD-10-CM

## 2019-07-02 DIAGNOSIS — K21.9 GASTROESOPHAGEAL REFLUX DISEASE, ESOPHAGITIS PRESENCE NOT SPECIFIED: Primary | ICD-10-CM

## 2019-07-02 DIAGNOSIS — S83.281A ACUTE LATERAL MENISCUS TEAR OF RIGHT KNEE, INITIAL ENCOUNTER: ICD-10-CM

## 2019-07-02 DIAGNOSIS — R10.13 EPIGASTRIC PAIN: ICD-10-CM

## 2019-07-02 PROCEDURE — 99213 OFFICE O/P EST LOW 20 MIN: CPT | Performed by: PHYSICIAN ASSISTANT

## 2019-07-02 PROCEDURE — 73721 MRI JNT OF LWR EXTRE W/O DYE: CPT

## 2019-07-02 RX ORDER — RANITIDINE 150 MG/1
150 TABLET ORAL 2 TIMES DAILY
Qty: 60 TABLET | Refills: 0 | Status: SHIPPED | OUTPATIENT
Start: 2019-07-02 | End: 2020-05-12

## 2019-07-02 RX ORDER — BISACODYL 5 MG/1
10 TABLET, DELAYED RELEASE ORAL DAILY PRN
Qty: 10 TABLET | Refills: 0 | Status: SHIPPED | OUTPATIENT
Start: 2019-07-02 | End: 2019-10-24

## 2019-07-02 RX ORDER — ONDANSETRON 4 MG/1
4 TABLET, FILM COATED ORAL EVERY 8 HOURS PRN
Qty: 21 TABLET | Refills: 0 | Status: SHIPPED | OUTPATIENT
Start: 2019-07-02 | End: 2021-11-19 | Stop reason: SDUPTHER

## 2019-07-02 NOTE — PROGRESS NOTES
Chief Complaint   Patient presents with   • GI Problem     patient states for aboutt 8 weeks he has been having some stomach pain, along with a lot of gas, patient states when he belches he does have some temporary relief.  Patient states he also has nausea off and on.       Subjective   Willi Rayo is a 53 y.o. male    History of Present Illness     GI problem:  Symptoms began about 8 weeks ago.  He describes sour stomach, gas, belching.  He reports that with belching there is some relief of the symptoms.  There is some associated nausea/vomiting.  The symptoms resolved for a few weeks, and then symptoms returned.  This has happened several times, where the symptoms will resolve and return a week or two later.  He has not noted any food triggers.  He denies any diarrhea.  Has had some mild constipation.  He reports that stools are not hard, but reports that he has a BM every 4 days.  He has tried OTC Tums without relief of symptoms.  He has had EGD in the past without problems.  He is due to have colonoscopy last year.  PMH of coronary artery disease.  He reports cardiac cath a few years ago.   Patient denies any chest pain with his associated symptoms.      Past Medical History:   Diagnosis Date   • Hyperlipidemia    • Hypertension    • Sinusitis    • Sleep apnea      Past Surgical History:   Procedure Laterality Date   • COLONOSCOPY  2009   • CORONARY ANGIOPLASTY Right    • MANDIBLE FRACTURE CLOSED REDUCTION     • UPPER GASTROINTESTINAL ENDOSCOPY  2009   • VASECTOMY       Family History   Problem Relation Age of Onset   • Heart disease Mother    • Heart disease Father    • Hyperlipidemia Father    • Hypertension Father    • Cancer Maternal Grandmother    • Stroke Maternal Grandmother    • Colon cancer Neg Hx      Social History     Socioeconomic History   • Marital status:      Spouse name: Not on file   • Number of children: Not on file   • Years of education: Not on file   • Highest education level:  Not on file   Tobacco Use   • Smoking status: Never Smoker   • Smokeless tobacco: Never Used   Substance and Sexual Activity   • Alcohol use: No     Comment: social   • Drug use: No   • Sexual activity: Defer     Allergies   Allergen Reactions   • Amoxicillin    • Azithromycin    • Penicillins    • Prednisone          Review of Systems   Constitutional: Negative for activity change, appetite change, chills, diaphoresis, fatigue, fever, unexpected weight gain and unexpected weight loss.   Respiratory: Negative for chest tightness and shortness of breath.    Cardiovascular: Negative for chest pain and leg swelling.   Gastrointestinal: Positive for abdominal distention (gas/bloating), constipation, nausea, GERD and indigestion. Negative for anal bleeding, blood in stool, diarrhea and vomiting.   Musculoskeletal: Negative for arthralgias, back pain, joint swelling and myalgias.   Neurological: Negative for weakness.     Objective     Vitals:    07/02/19 1435   BP: 130/70   Pulse: 87   Temp: 98.7 °F (37.1 °C)   SpO2: 96%       Physical Exam   Constitutional: He is oriented to person, place, and time. He appears well-developed and well-nourished. No distress.   Obese male, NAD.  Pleasant.    HENT:   Head: Normocephalic and atraumatic.   Eyes: Conjunctivae and EOM are normal. Pupils are equal, round, and reactive to light.   Neck: Normal range of motion. Neck supple.   Cardiovascular: Normal rate, regular rhythm, normal heart sounds and intact distal pulses. Exam reveals no gallop and no friction rub.   No murmur heard.  Pulmonary/Chest: Effort normal and breath sounds normal. No respiratory distress. He has no wheezes. He has no rales. He exhibits no tenderness.   Abdominal: Soft. Normal appearance. There is tenderness in the epigastric area. There is no rigidity, no rebound, no guarding, no tenderness at McBurney's point and negative Cisneros's sign.   Neurological: He is alert and oriented to person, place, and time.    Skin: Skin is warm and dry. Capillary refill takes less than 2 seconds. He is not diaphoretic.   Psychiatric: He has a normal mood and affect. His behavior is normal.   Nursing note and vitals reviewed.      Assessment/Plan     Willi was seen today for gi problem.    Diagnoses and all orders for this visit:    Gastroesophageal reflux disease, esophagitis presence not specified  -     raNITIdine (ZANTAC) 150 MG tablet; Take 1 tablet by mouth 2 (Two) Times a Day.    Constipation, unspecified constipation type  -     bisacodyl (DULCOLAX) 5 MG EC tablet; Take 2 tablets by mouth Daily As Needed for Constipation. Do not take any longer than 3 days.    Epigastric pain  -     Ambulatory Referral to General Surgery    Non-intractable vomiting with nausea, unspecified vomiting type  -     ondansetron (ZOFRAN) 4 MG tablet; Take 1 tablet by mouth Every 8 (Eight) Hours As Needed for Nausea or Vomiting.    Colon cancer screening  -     Ambulatory Referral to General Surgery    Start Zantac twice daily.  Miralax daily for constipation, ducolax if BM is not produced with miralax.  Increase water intake.  Avoid spicy foods, tomato based products, ect.  Zofran as needed.  Patient is past due for colonoscopy.  Will place referral for this.  Additionally, have advised patient with history of CAD that if symptoms are not improving with current plan, would have him RTC.  Consider referral to cardiology for stress test.  Patient agrees with plan and is in understanding.  He would like to try medications before a referral to cardiology or additional imaging is obtained.      Return if symptoms worsen or fail to improve, for Next scheduled follow up.    Payton Vuong PA-C

## 2019-07-12 ENCOUNTER — OFFICE VISIT (OUTPATIENT)
Dept: SURGERY | Facility: CLINIC | Age: 54
End: 2019-07-12

## 2019-07-12 VITALS
BODY MASS INDEX: 41.75 KG/M2 | HEIGHT: 73 IN | TEMPERATURE: 97.4 F | WEIGHT: 315 LBS | SYSTOLIC BLOOD PRESSURE: 130 MMHG | DIASTOLIC BLOOD PRESSURE: 78 MMHG | HEART RATE: 95 BPM | OXYGEN SATURATION: 98 %

## 2019-07-12 DIAGNOSIS — Z12.11 COLON CANCER SCREENING: ICD-10-CM

## 2019-07-12 DIAGNOSIS — R10.13 EPIGASTRIC PAIN: Primary | ICD-10-CM

## 2019-07-12 DIAGNOSIS — R10.11 RIGHT UPPER QUADRANT ABDOMINAL PAIN: ICD-10-CM

## 2019-07-12 PROCEDURE — 99204 OFFICE O/P NEW MOD 45 MIN: CPT | Performed by: SURGERY

## 2019-07-12 RX ORDER — BISACODYL 5 MG/1
TABLET, DELAYED RELEASE ORAL
Qty: 4 TABLET | Refills: 0 | Status: SHIPPED | OUTPATIENT
Start: 2019-07-12 | End: 2019-10-24

## 2019-07-12 NOTE — H&P (VIEW-ONLY)
Patient: Willi Rayo    YOB: 1965    Date: 07/12/2019    Primary Care Provider: Paul Verduzco MD    Reason for Consultation: EGD/ colon cancer screening    Chief Complaint   Patient presents with   • Abdominal Pain       Subjective .     History of present illness:  I saw the patient in the office today as a consultation for evaluation and treatment of epigastric pain, bloating, nausea, and vomiting for 3 months. Patient states episodes of nausea and bloating for several days followed by vomiting. Patient states after vomiting he does feel some relief however symptoms will return.  Last colonoscopy over 10 years ago, no history of colon cancer or polyps.  No rectal bleeding.  Significant fatty food intolerance and epigastric pain that is burning in nature.  Some relief with Zantac which he recently started.    The following portions of the patient's history were reviewed and updated as appropriate: allergies, current medications, past family history, past medical history, past social history, past surgical history and problem list.      Review of Systems   Constitutional: Negative for activity change, appetite change and chills.   HENT: Negative for congestion and hearing loss.    Respiratory: Negative for cough, choking, chest tightness and shortness of breath.    Cardiovascular: Negative for chest pain, palpitations and leg swelling.   Gastrointestinal: Positive for abdominal pain, nausea and vomiting.   Endocrine: Negative for cold intolerance and heat intolerance.   Genitourinary: Negative for dysuria, flank pain and frequency.   Musculoskeletal: Negative for back pain and myalgias.   Skin: Negative for color change and rash.   Neurological: Negative for seizures, facial asymmetry, light-headedness, numbness and headaches.   Psychiatric/Behavioral: Negative for agitation, behavioral problems and confusion.       History:  Past Medical History:   Diagnosis Date   • Hyperlipidemia    •  Hypertension    • Sinusitis    • Sleep apnea        Past Surgical History:   Procedure Laterality Date   • COLONOSCOPY  2009   • CORONARY ANGIOPLASTY Right    • MANDIBLE FRACTURE CLOSED REDUCTION     • UPPER GASTROINTESTINAL ENDOSCOPY  2009   • VASECTOMY         Family History   Problem Relation Age of Onset   • Heart disease Mother    • Heart disease Father    • Hyperlipidemia Father    • Hypertension Father    • Cancer Maternal Grandmother    • Stroke Maternal Grandmother    • Colon cancer Neg Hx        Social History     Tobacco Use   • Smoking status: Never Smoker   • Smokeless tobacco: Never Used   Substance Use Topics   • Alcohol use: No     Comment: social   • Drug use: No       Medications:    Current Outpatient Medications:   •  atorvastatin (LIPITOR) 40 MG tablet, Take 1 tablet by mouth Daily., Disp: 90 tablet, Rfl: 1  •  bisacodyl (DULCOLAX) 5 MG EC tablet, Take 2 tablets by mouth Daily As Needed for Constipation. Do not take any longer than 3 days., Disp: 10 tablet, Rfl: 0  •  bisacodyl (DULCOLAX) 5 MG EC tablet, Clear liquid diet day prior to colonoscopy. 2 at 3pm and 2 at 7pm., Disp: 4 tablet, Rfl: 0  •  diclofenac (VOLTAREN) 50 MG EC tablet, Take 1 tablet by mouth 2 (Two) Times a Day As Needed (pain)., Disp: 60 tablet, Rfl: 0  •  metoprolol succinate XL (TOPROL-XL) 50 MG 24 hr tablet, Take 1 tablet by mouth Daily., Disp: 90 tablet, Rfl: 1  •  NITROSTAT 0.4 MG SL tablet, PLACE NOHELIA, Disp: , Rfl: 0  •  ondansetron (ZOFRAN) 4 MG tablet, Take 1 tablet by mouth Every 8 (Eight) Hours As Needed for Nausea or Vomiting., Disp: 21 tablet, Rfl: 0  •  polyethylene glycol (MIRALAX) powder powder, Clear liquid diet day prior to colonoscopy. Mix with 64oz of clear liquid. Drink 8oz every 10-15 min until consumed., Disp: 238 g, Rfl: 0  •  raNITIdine (ZANTAC) 150 MG tablet, Take 1 tablet by mouth 2 (Two) Times a Day., Disp: 60 tablet, Rfl: 0  •  SUMAtriptan (IMITREX) 50 MG tablet, Take 1 tablet by mouth 1 (One) Time As  "Needed for Migraine for up to 1 dose., Disp: 15 tablet, Rfl: 1     Allergies:  Allergies   Allergen Reactions   • Amoxicillin    • Azithromycin    • Penicillins    • Prednisone        Objective     Vital Signs:   Vitals:    07/12/19 1440   BP: 130/78   Pulse: 95   Temp: 97.4 °F (36.3 °C)   SpO2: 98%   Weight: (!) 198 kg (437 lb)   Height: 185.4 cm (72.99\")       Physical Exam:   General Appearance:    Alert, cooperative, in no acute distress   Head:    Normocephalic, without obvious abnormality, atraumatic   Eyes:            Lids and lashes normal, conjunctivae and sclerae normal, no   icterus, no pallor, corneas clear, PERRL   Ears:    Ears appear intact with no abnormalities noted   Throat:   No oral lesions, no thrush, oral mucosa moist   Neck:   No adenopathy, supple, trachea midline, no thyromegaly,  no JVD   Lungs/respiratory:     Clear to auscultation,respirations regular, even and                  unlabored    Heart/cardiovascular:    Regular rhythm and normal rate, normal S1 and S2, no            murmur   Abdomen:     no masses, no organomegaly, soft with right upper quadrant epigastric tenderness.  No rebound or guarding.   Extremities:   Moves all extremities well, no edema, no cyanosis, no             redness   Pulses:   Pulses palpable and equal bilaterally   Skin:   No bleeding, bruising or rash   Lymph nodes:   No palpable adenopathy   Neurologic:   Cranial nerves 2 - 12 grossly intact, sensation intact   Psychiatric: No evidence of depression or anxiety      Results Review:   I reviewed the patient's new clinical results.    Review of Systems was reviewed and confirmed as accurate today.    Assessment/Plan :    1. Epigastric pain    2. Colon cancer screening    3. Right upper quadrant abdominal pain        I recommend a EGD and colonoscopy for further evaluation.  The procedure as well as the risks were explained which include but are not limited to bleeding, infection, intestinal perforation, " Aspiration etc. were explained and the patient understood all of the above and wishes to proceed with an EGD and colonoscopy.  Patient also will be scheduled for gallbladder ultrasound on follow-up visit.    Electronically signed by Sharon Ventura MD  07/12/19  2:41 PM

## 2019-07-15 DIAGNOSIS — I10 BENIGN ESSENTIAL HYPERTENSION: ICD-10-CM

## 2019-07-15 PROBLEM — R10.13 EPIGASTRIC PAIN: Status: ACTIVE | Noted: 2019-07-15

## 2019-07-15 RX ORDER — METOPROLOL SUCCINATE 50 MG/1
50 TABLET, EXTENDED RELEASE ORAL DAILY
Qty: 90 TABLET | Refills: 1 | Status: SHIPPED | OUTPATIENT
Start: 2019-07-15 | End: 2020-01-21

## 2019-07-16 ENCOUNTER — TELEPHONE (OUTPATIENT)
Dept: SURGERY | Facility: CLINIC | Age: 54
End: 2019-07-16

## 2019-07-18 ENCOUNTER — ANESTHESIA (OUTPATIENT)
Dept: GASTROENTEROLOGY | Facility: HOSPITAL | Age: 54
End: 2019-07-18

## 2019-07-18 ENCOUNTER — HOSPITAL ENCOUNTER (OUTPATIENT)
Facility: HOSPITAL | Age: 54
Setting detail: HOSPITAL OUTPATIENT SURGERY
Discharge: HOME | End: 2019-07-18
Attending: SURGERY | Admitting: SURGERY

## 2019-07-18 ENCOUNTER — ANESTHESIA EVENT (OUTPATIENT)
Dept: GASTROENTEROLOGY | Facility: HOSPITAL | Age: 54
End: 2019-07-18

## 2019-07-18 VITALS
SYSTOLIC BLOOD PRESSURE: 121 MMHG | BODY MASS INDEX: 41.75 KG/M2 | WEIGHT: 315 LBS | HEART RATE: 90 BPM | HEIGHT: 73 IN | TEMPERATURE: 97.3 F | OXYGEN SATURATION: 94 % | RESPIRATION RATE: 18 BRPM | DIASTOLIC BLOOD PRESSURE: 79 MMHG

## 2019-07-18 DIAGNOSIS — Z12.11 COLON CANCER SCREENING: ICD-10-CM

## 2019-07-18 DIAGNOSIS — R10.13 EPIGASTRIC PAIN: ICD-10-CM

## 2019-07-18 PROCEDURE — 25010000002 ONDANSETRON PER 1 MG: Performed by: NURSE ANESTHETIST, CERTIFIED REGISTERED

## 2019-07-18 PROCEDURE — 25010000002 PROPOFOL 200 MG/20ML EMULSION: Performed by: NURSE ANESTHETIST, CERTIFIED REGISTERED

## 2019-07-18 PROCEDURE — 25010000002 MIDAZOLAM PER 1 MG: Performed by: NURSE ANESTHETIST, CERTIFIED REGISTERED

## 2019-07-18 RX ORDER — MIDAZOLAM HYDROCHLORIDE 1 MG/ML
INJECTION INTRAMUSCULAR; INTRAVENOUS AS NEEDED
Status: DISCONTINUED | OUTPATIENT
Start: 2019-07-18 | End: 2019-07-18 | Stop reason: SURG

## 2019-07-18 RX ORDER — LIDOCAINE HYDROCHLORIDE 20 MG/ML
INJECTION, SOLUTION INTRAVENOUS AS NEEDED
Status: DISCONTINUED | OUTPATIENT
Start: 2019-07-18 | End: 2019-07-18 | Stop reason: SURG

## 2019-07-18 RX ORDER — SODIUM CHLORIDE, SODIUM LACTATE, POTASSIUM CHLORIDE, CALCIUM CHLORIDE 600; 310; 30; 20 MG/100ML; MG/100ML; MG/100ML; MG/100ML
1000 INJECTION, SOLUTION INTRAVENOUS CONTINUOUS
Status: DISCONTINUED | OUTPATIENT
Start: 2019-07-18 | End: 2019-07-18 | Stop reason: HOSPADM

## 2019-07-18 RX ORDER — ONDANSETRON 2 MG/ML
4 INJECTION INTRAMUSCULAR; INTRAVENOUS ONCE AS NEEDED
Status: DISCONTINUED | OUTPATIENT
Start: 2019-07-18 | End: 2019-07-18 | Stop reason: HOSPADM

## 2019-07-18 RX ORDER — GLYCOPYRROLATE 0.2 MG/ML
INJECTION INTRAMUSCULAR; INTRAVENOUS AS NEEDED
Status: DISCONTINUED | OUTPATIENT
Start: 2019-07-18 | End: 2019-07-18 | Stop reason: SURG

## 2019-07-18 RX ORDER — KETAMINE HCL IN NACL, ISO-OSM 100MG/10ML
SYRINGE (ML) INJECTION AS NEEDED
Status: DISCONTINUED | OUTPATIENT
Start: 2019-07-18 | End: 2019-07-18 | Stop reason: SURG

## 2019-07-18 RX ORDER — MAGNESIUM HYDROXIDE 1200 MG/15ML
LIQUID ORAL AS NEEDED
Status: DISCONTINUED | OUTPATIENT
Start: 2019-07-18 | End: 2019-07-18 | Stop reason: HOSPADM

## 2019-07-18 RX ORDER — PROPOFOL 10 MG/ML
INJECTION, EMULSION INTRAVENOUS AS NEEDED
Status: DISCONTINUED | OUTPATIENT
Start: 2019-07-18 | End: 2019-07-18 | Stop reason: SURG

## 2019-07-18 RX ORDER — ONDANSETRON 2 MG/ML
INJECTION INTRAMUSCULAR; INTRAVENOUS AS NEEDED
Status: DISCONTINUED | OUTPATIENT
Start: 2019-07-18 | End: 2019-07-18 | Stop reason: SURG

## 2019-07-18 RX ADMIN — SODIUM CHLORIDE, POTASSIUM CHLORIDE, SODIUM LACTATE AND CALCIUM CHLORIDE 1000 ML: 600; 310; 30; 20 INJECTION, SOLUTION INTRAVENOUS at 11:17

## 2019-07-18 RX ADMIN — PROPOFOL 50 MG: 10 INJECTION, EMULSION INTRAVENOUS at 12:30

## 2019-07-18 RX ADMIN — PROPOFOL 100 MG: 10 INJECTION, EMULSION INTRAVENOUS at 12:19

## 2019-07-18 RX ADMIN — LIDOCAINE HYDROCHLORIDE 60 MG: 20 INJECTION, SOLUTION INTRAVENOUS at 12:19

## 2019-07-18 RX ADMIN — ONDANSETRON 4 MG: 2 INJECTION INTRAMUSCULAR; INTRAVENOUS at 12:41

## 2019-07-18 RX ADMIN — Medication 10 MG: at 12:19

## 2019-07-18 RX ADMIN — GLYCOPYRROLATE 0.1 MG: 0.2 INJECTION, SOLUTION INTRAMUSCULAR; INTRAVENOUS at 12:11

## 2019-07-18 RX ADMIN — MIDAZOLAM HYDROCHLORIDE 2 MG: 1 INJECTION, SOLUTION INTRAMUSCULAR; INTRAVENOUS at 12:19

## 2019-07-18 RX ADMIN — PROPOFOL 100 MG: 10 INJECTION, EMULSION INTRAVENOUS at 12:26

## 2019-07-18 RX ADMIN — PROPOFOL 50 MG: 10 INJECTION, EMULSION INTRAVENOUS at 12:35

## 2019-07-18 RX ADMIN — PROPOFOL 50 MG: 10 INJECTION, EMULSION INTRAVENOUS at 12:23

## 2019-07-18 NOTE — ANESTHESIA PREPROCEDURE EVALUATION
Anesthesia Evaluation     Patient summary reviewed and Nursing notes reviewed   NPO Solid Status: > 8 hours  NPO Liquid Status: > 8 hours           Airway   Mallampati: II  TM distance: >3 FB  Neck ROM: full  No difficulty expected  Dental - normal exam     Pulmonary - normal exam   (+) sleep apnea (non-compliant with CPAP) on CPAP,   (-) not a smoker  Cardiovascular - normal exam  Exercise tolerance: poor (<4 METS)    (+) hypertension well controlled less than 2 medications, CAD, hyperlipidemia,     ROS comment: Cardiomyopathy     Neuro/Psych  (+) headaches (migraines ),     GI/Hepatic/Renal/Endo    (+) obesity, morbid obesity, GERD poorly controlled,      Musculoskeletal     Abdominal    Substance History      OB/GYN          Other   (+) arthritis     ROS/Med Hx Other: RLS                  Anesthesia Plan    ASA 3     MAC   (Risks and benefits discussed including risk of aspiration, recall and dental damage. All patient questions answered. Will continue with POC.)  intravenous induction   Anesthetic plan, all risks, benefits, and alternatives have been provided, discussed and informed consent has been obtained with: patient.

## 2019-07-18 NOTE — ANESTHESIA POSTPROCEDURE EVALUATION
Patient: Willi Rayo    Procedure Summary     Date:  07/18/19 Room / Location:  [facility] ENDOSCOPY 3 / [facility] ENDOSCOPY    Anesthesia Start:  1212 Anesthesia Stop:  1254    Procedures:       ESOPHAGOGASTRODUODENOSCOPY WITH BIOPSY (N/A Esophagus)      COLONOSCOPY WITH HOT BIOPSY POLYPECTOMY, HOT SNARE POLYPECTOMY (N/A ) Diagnosis:       Epigastric pain      Colon cancer screening      (Epigastric pain [R10.13])      (Colon cancer screening [Z12.11])    Surgeon:  Sharon Ventura MD Provider:  Jessy Bran CRNA    Anesthesia Type:  MAC ASA Status:  3          Anesthesia Type: MAC  Last vitals  BP   121/79 (07/18/19 1325)   Temp   97.3 °F (36.3 °C) (07/18/19 1255)   Pulse   90 (07/18/19 1325)   Resp   18 (07/18/19 1325)     SpO2   94 % (07/18/19 1325)     Post Anesthesia Care and Evaluation    Patient location during evaluation: PHASE II  Patient participation: complete - patient participated  Level of consciousness: awake and alert  Pain score: 0  Pain management: satisfactory to patient  Airway patency: patent  Anesthetic complications: No anesthetic complications  PONV Status: none  Cardiovascular status: acceptable and stable  Respiratory status: acceptable  Hydration status: acceptable

## 2019-07-22 ENCOUNTER — OFFICE VISIT (OUTPATIENT)
Dept: SURGERY | Facility: CLINIC | Age: 54
End: 2019-07-22

## 2019-07-22 VITALS
HEART RATE: 85 BPM | DIASTOLIC BLOOD PRESSURE: 82 MMHG | HEIGHT: 73 IN | SYSTOLIC BLOOD PRESSURE: 132 MMHG | BODY MASS INDEX: 41.75 KG/M2 | OXYGEN SATURATION: 99 % | WEIGHT: 315 LBS | TEMPERATURE: 98.9 F

## 2019-07-22 DIAGNOSIS — R10.11 RIGHT UPPER QUADRANT ABDOMINAL PAIN: ICD-10-CM

## 2019-07-22 DIAGNOSIS — R10.13 EPIGASTRIC PAIN: Primary | ICD-10-CM

## 2019-07-22 DIAGNOSIS — K21.00 GASTROESOPHAGEAL REFLUX DISEASE WITH ESOPHAGITIS: ICD-10-CM

## 2019-07-22 PROCEDURE — 99213 OFFICE O/P EST LOW 20 MIN: CPT | Performed by: SURGERY

## 2019-07-22 NOTE — PROGRESS NOTES
Patient: Willi Rayo    YOB: 1965    Date: 07/22/2019    Primary Care Provider: Paul Verduzco MD    Reason for Consultation: Follow-up EGD    Chief Complaint   Patient presents with   • Follow-up     EGD and colon       History of present illness:  I saw the patient in the office today as a followup from their recent EGD with biopsy and colonoscopy, the pathology report did show reactive gastropathy wild mild nonspecific chronic inflammation, squamous mucosa and tubular adenoma.  They state that they have done well and have no complaints.  Patient was complaining of right upper quadrant pain, nausea and fatty food intolerance.  Some relief with Zantac.  He started that 2 weeks ago.  Ultrasound the gallbladder today shows small amount of sludge but no stones or inflammation.    The following portions of the patient's history were reviewed and updated as appropriate: allergies, current medications, past family history, past medical history, past social history, past surgical history and problem list.      Review of Systems   Constitutional: Negative for chills, fever and unexpected weight change.   HENT: Negative for trouble swallowing and voice change.    Eyes: Negative for visual disturbance.   Respiratory: Negative for apnea, cough, chest tightness, shortness of breath and wheezing.    Cardiovascular: Negative for chest pain, palpitations and leg swelling.   Gastrointestinal: Negative for abdominal distention, abdominal pain, anal bleeding, blood in stool, constipation, diarrhea, nausea, rectal pain and vomiting.   Endocrine: Negative for cold intolerance and heat intolerance.   Genitourinary: Negative for difficulty urinating, dysuria, flank pain, scrotal swelling and testicular pain.   Musculoskeletal: Negative for back pain, gait problem and joint swelling.   Skin: Negative for color change, rash and wound.   Neurological: Negative for dizziness, syncope, speech difficulty, weakness, numbness  "and headaches.   Hematological: Negative for adenopathy. Does not bruise/bleed easily.   Psychiatric/Behavioral: Negative for confusion. The patient is not nervous/anxious.        Vital Signs:   Vitals:    07/22/19 1247   BP: 132/82   Pulse: 85   Temp: 98.9 °F (37.2 °C)   SpO2: 99%   Weight: (!) 197 kg (435 lb)   Height: 185.4 cm (72.99\")       Allergies:  Allergies   Allergen Reactions   • Amoxicillin Shortness Of Breath   • Azithromycin Shortness Of Breath   • Cephalosporins Shortness Of Breath   • Penicillins Shortness Of Breath   • Prednisone Irritability       Medications:    Current Outpatient Medications:   •  atorvastatin (LIPITOR) 40 MG tablet, Take 1 tablet by mouth Daily., Disp: 90 tablet, Rfl: 1  •  bisacodyl (DULCOLAX) 5 MG EC tablet, Take 2 tablets by mouth Daily As Needed for Constipation. Do not take any longer than 3 days., Disp: 10 tablet, Rfl: 0  •  bisacodyl (DULCOLAX) 5 MG EC tablet, Clear liquid diet day prior to colonoscopy. 2 at 3pm and 2 at 7pm., Disp: 4 tablet, Rfl: 0  •  diclofenac (VOLTAREN) 50 MG EC tablet, Take 1 tablet by mouth 2 (Two) Times a Day As Needed (pain)., Disp: 60 tablet, Rfl: 0  •  metoprolol succinate XL (TOPROL-XL) 50 MG 24 hr tablet, Take 1 tablet by mouth Daily., Disp: 90 tablet, Rfl: 1  •  NITROSTAT 0.4 MG SL tablet, PLACE NOHELIA, Disp: , Rfl: 0  •  ondansetron (ZOFRAN) 4 MG tablet, Take 1 tablet by mouth Every 8 (Eight) Hours As Needed for Nausea or Vomiting., Disp: 21 tablet, Rfl: 0  •  polyethylene glycol (MIRALAX) powder powder, Clear liquid diet day prior to colonoscopy. Mix with 64oz of clear liquid. Drink 8oz every 10-15 min until consumed., Disp: 238 g, Rfl: 0  •  raNITIdine (ZANTAC) 150 MG tablet, Take 1 tablet by mouth 2 (Two) Times a Day., Disp: 60 tablet, Rfl: 0  •  SUMAtriptan (IMITREX) 50 MG tablet, Take 1 tablet by mouth 1 (One) Time As Needed for Migraine for up to 1 dose., Disp: 15 tablet, Rfl: 1    Physical Exam:   General Appearance:    Alert, " cooperative, in no acute distress   Abdomen:     no masses, no organomegaly, soft and tender in the epigastric region without guarding or rebound.   Chest:      Clear toausculation            Cor:  Regular rate and rhythm      Results Review:   I reviewed the patient's new clinical results.    Assessment / Plan:    1. Epigastric pain    2. Right upper quadrant abdominal pain    3. Gastroesophageal reflux disease with esophagitis        I did discuss the situation with the patient today in the office and they have done well from their recent EGD with biopsy. I have told the patient to continue Zantac twice a day to alleviate his symptoms of reflux.  If no improvement, will consider HIDA scan to rule out a calculus cholecystitis due to find the sludge on gallbladder ultrasound.  Patient will need repeat colonoscopy in 5 years..    Electronically signed by Sharon Ventura MD  07/22/19      Portions of this note has been scribed for Sharon Ventura MD by Chikis Albarran. 7/22/2019  1:15 PM

## 2019-07-24 LAB
LAB AP CASE REPORT: NORMAL
PATH REPORT.FINAL DX SPEC: NORMAL

## 2019-09-10 ENCOUNTER — OFFICE VISIT (OUTPATIENT)
Dept: INTERNAL MEDICINE | Facility: CLINIC | Age: 54
End: 2019-09-10

## 2019-09-10 VITALS
HEART RATE: 83 BPM | DIASTOLIC BLOOD PRESSURE: 86 MMHG | WEIGHT: 315 LBS | BODY MASS INDEX: 41.75 KG/M2 | SYSTOLIC BLOOD PRESSURE: 130 MMHG | TEMPERATURE: 98.9 F | HEIGHT: 73 IN | OXYGEN SATURATION: 96 %

## 2019-09-10 DIAGNOSIS — Z13.220 SCREENING, LIPID: ICD-10-CM

## 2019-09-10 DIAGNOSIS — Z13.0 SCREENING FOR ENDOCRINE, METABOLIC AND IMMUNITY DISORDER: ICD-10-CM

## 2019-09-10 DIAGNOSIS — R07.89 CHEST DISCOMFORT: Primary | ICD-10-CM

## 2019-09-10 DIAGNOSIS — Z12.5 SCREENING FOR PROSTATE CANCER: ICD-10-CM

## 2019-09-10 DIAGNOSIS — Z13.29 SCREENING FOR ENDOCRINE, METABOLIC AND IMMUNITY DISORDER: ICD-10-CM

## 2019-09-10 DIAGNOSIS — Z13.228 SCREENING FOR ENDOCRINE, METABOLIC AND IMMUNITY DISORDER: ICD-10-CM

## 2019-09-10 DIAGNOSIS — G47.33 OSA (OBSTRUCTIVE SLEEP APNEA): ICD-10-CM

## 2019-09-10 DIAGNOSIS — Z13.0 SCREENING FOR DISORDER OF BLOOD AND BLOOD-FORMING ORGANS: ICD-10-CM

## 2019-09-10 DIAGNOSIS — R53.83 FATIGUE, UNSPECIFIED TYPE: ICD-10-CM

## 2019-09-10 PROCEDURE — 99214 OFFICE O/P EST MOD 30 MIN: CPT | Performed by: NURSE PRACTITIONER

## 2019-09-10 NOTE — PROGRESS NOTES
"Date: 09/10/2019    Name: Willi Rayo  : 1965    Chief Complaint:   Chief Complaint   Patient presents with   • Chest Pain       HPI:  Mr Rayo woke up at 0230 this morning with dull pain in the center of his chest.  Pain did not radiate to either arm, back, jaw.  He took both nitroglycerin and Tums.  Pain was alleviated to an acceptable level within 5 minutes.  He has had cardiac chest pain in the past, states this was different from previous episodes.  Denies diaphoresis, nausea, lightheadedness, confusion, dizziness during episode at 0230 this morning.  He is accompanied by his significant other, who reports that Mr. Belcher has been eating later in the evening and complaining of GI upset after meals in recent weeks.  Mr. Belcher is also been snoring more loudly, waking himself up snorting more often. Describes very vivid dreams.  He has become increasingly fatigued, with daytime drowsiness.  Reports if he sits down, he falls asleep.  He was previously assessed for obstructive sleep apnea, prescribed CPAP.  Reports he is unable to find a mask that he can comfortably wear.  Would like to have obstructive sleep apnea reassessed.    History:  The following portions of the patient's history were reviewed and updated as appropriate: allergies, current medications, past medical history, family history, surgical history, social history and problem list.     ROS:  Review of Systems   Respiratory: Negative for wheezing.    Gastrointestinal: Positive for GERD and indigestion. Negative for constipation, diarrhea and nausea.       VS:  Vitals:    09/10/19 1302   BP: 130/86   Pulse: 83   Temp: 98.9 °F (37.2 °C)   TempSrc: Temporal   SpO2: 96%   Weight: (!) 197 kg (434 lb)   Height: 185.4 cm (72.99\")     Body mass index is 57.28 kg/m².      PE:  Physical Exam   Constitutional: He is oriented to person, place, and time. He appears well-developed. He is obese.  Eyes: Conjunctivae and EOM are normal. Pupils are equal, " round, and reactive to light.   Cardiovascular: Normal rate, regular rhythm, normal heart sounds and intact distal pulses.   No murmur heard.  Pulmonary/Chest: Effort normal and breath sounds normal.   Abdominal: Soft. Bowel sounds are normal. He exhibits no distension. There is no tenderness.   Neurological: He is alert and oriented to person, place, and time.   Skin: Skin is warm and dry. Capillary refill takes less than 2 seconds.         ECG 12 Lead  Date/Time: 9/13/2019 5:35 PM  Performed by: Apryl Olivas APRN  Authorized by: Apryl Olivas APRN   Comparison: compared with previous ECG from 5/4/2016  Comparison to previous ECG: Similar to previous EKG  Rhythm: sinus rhythm  Rate: normal  Conduction: conduction normal  ST Segments: ST segments normal  T Waves: T waves normal  QRS axis: normal    Clinical impression: normal ECG          Assessment/Plan:  Willi was seen today for chest pain.    Diagnoses and all orders for this visit:    Chest discomfort        - Continue to monitor for s/s of MI.  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

## 2019-09-13 PROCEDURE — 93000 ELECTROCARDIOGRAM COMPLETE: CPT | Performed by: NURSE PRACTITIONER

## 2019-09-20 ENCOUNTER — APPOINTMENT (OUTPATIENT)
Dept: LAB | Facility: HOSPITAL | Age: 54
End: 2019-09-20

## 2019-09-20 LAB
ALBUMIN SERPL-MCNC: 4.2 G/DL (ref 3.5–5.2)
ALBUMIN/GLOB SERPL: 1.2 G/DL
ALP SERPL-CCNC: 90 U/L (ref 39–117)
ALT SERPL W P-5'-P-CCNC: 25 U/L (ref 1–41)
ANION GAP SERPL CALCULATED.3IONS-SCNC: 13.2 MMOL/L (ref 5–15)
AST SERPL-CCNC: 19 U/L (ref 1–40)
BASOPHILS # BLD AUTO: 0.07 10*3/MM3 (ref 0–0.2)
BASOPHILS NFR BLD AUTO: 0.8 % (ref 0–1.5)
BILIRUB SERPL-MCNC: 0.7 MG/DL (ref 0.2–1.2)
BUN BLD-MCNC: 13 MG/DL (ref 6–20)
BUN/CREAT SERPL: 13 (ref 7–25)
CALCIUM SPEC-SCNC: 9.5 MG/DL (ref 8.6–10.5)
CHLORIDE SERPL-SCNC: 96 MMOL/L (ref 98–107)
CHOLEST SERPL-MCNC: 131 MG/DL (ref 0–200)
CO2 SERPL-SCNC: 24.8 MMOL/L (ref 22–29)
CREAT BLD-MCNC: 1 MG/DL (ref 0.76–1.27)
DEPRECATED RDW RBC AUTO: 40.8 FL (ref 37–54)
EOSINOPHIL # BLD AUTO: 0.28 10*3/MM3 (ref 0–0.4)
EOSINOPHIL NFR BLD AUTO: 3.3 % (ref 0.3–6.2)
ERYTHROCYTE [DISTWIDTH] IN BLOOD BY AUTOMATED COUNT: 13 % (ref 12.3–15.4)
GFR SERPL CREATININE-BSD FRML MDRD: 78 ML/MIN/1.73
GLOBULIN UR ELPH-MCNC: 3.6 GM/DL
GLUCOSE BLD-MCNC: 100 MG/DL (ref 65–99)
HBA1C MFR BLD: 6.1 % (ref 4.8–5.6)
HCT VFR BLD AUTO: 50.8 % (ref 37.5–51)
HDLC SERPL-MCNC: 28 MG/DL (ref 40–60)
HGB BLD-MCNC: 17 G/DL (ref 13–17.7)
IMM GRANULOCYTES # BLD AUTO: 0.04 10*3/MM3 (ref 0–0.05)
IMM GRANULOCYTES NFR BLD AUTO: 0.5 % (ref 0–0.5)
LDLC SERPL CALC-MCNC: 83 MG/DL (ref 0–100)
LDLC/HDLC SERPL: 2.98 {RATIO}
LYMPHOCYTES # BLD AUTO: 2.29 10*3/MM3 (ref 0.7–3.1)
LYMPHOCYTES NFR BLD AUTO: 27.4 % (ref 19.6–45.3)
MCH RBC QN AUTO: 29.3 PG (ref 26.6–33)
MCHC RBC AUTO-ENTMCNC: 33.5 G/DL (ref 31.5–35.7)
MCV RBC AUTO: 87.6 FL (ref 79–97)
MONOCYTES # BLD AUTO: 0.49 10*3/MM3 (ref 0.1–0.9)
MONOCYTES NFR BLD AUTO: 5.9 % (ref 5–12)
NEUTROPHILS # BLD AUTO: 5.2 10*3/MM3 (ref 1.7–7)
NEUTROPHILS NFR BLD AUTO: 62.1 % (ref 42.7–76)
NRBC BLD AUTO-RTO: 0 /100 WBC (ref 0–0.2)
PLATELET # BLD AUTO: 290 10*3/MM3 (ref 140–450)
PMV BLD AUTO: 10 FL (ref 6–12)
POTASSIUM BLD-SCNC: 4.4 MMOL/L (ref 3.5–5.2)
PROT SERPL-MCNC: 7.8 G/DL (ref 6–8.5)
PSA SERPL-MCNC: 1.32 NG/ML (ref 0–4)
RBC # BLD AUTO: 5.8 10*6/MM3 (ref 4.14–5.8)
SODIUM BLD-SCNC: 134 MMOL/L (ref 136–145)
TRIGL SERPL-MCNC: 98 MG/DL (ref 0–150)
TSH SERPL DL<=0.05 MIU/L-ACNC: 2.04 UIU/ML (ref 0.27–4.2)
VLDLC SERPL-MCNC: 19.6 MG/DL (ref 5–40)
WBC NRBC COR # BLD: 8.37 10*3/MM3 (ref 3.4–10.8)

## 2019-09-20 PROCEDURE — 85025 COMPLETE CBC W/AUTO DIFF WBC: CPT | Performed by: NURSE PRACTITIONER

## 2019-09-20 PROCEDURE — 36415 COLL VENOUS BLD VENIPUNCTURE: CPT | Performed by: NURSE PRACTITIONER

## 2019-09-20 PROCEDURE — 84443 ASSAY THYROID STIM HORMONE: CPT | Performed by: NURSE PRACTITIONER

## 2019-09-20 PROCEDURE — 83036 HEMOGLOBIN GLYCOSYLATED A1C: CPT | Performed by: NURSE PRACTITIONER

## 2019-09-20 PROCEDURE — G0103 PSA SCREENING: HCPCS | Performed by: NURSE PRACTITIONER

## 2019-09-20 PROCEDURE — 80061 LIPID PANEL: CPT | Performed by: NURSE PRACTITIONER

## 2019-09-20 PROCEDURE — 80053 COMPREHEN METABOLIC PANEL: CPT | Performed by: NURSE PRACTITIONER

## 2019-10-24 ENCOUNTER — TELEPHONE (OUTPATIENT)
Dept: INTERNAL MEDICINE | Facility: CLINIC | Age: 54
End: 2019-10-24

## 2019-10-24 ENCOUNTER — OFFICE VISIT (OUTPATIENT)
Dept: INTERNAL MEDICINE | Facility: CLINIC | Age: 54
End: 2019-10-24

## 2019-10-24 VITALS
HEIGHT: 73 IN | BODY MASS INDEX: 41.75 KG/M2 | SYSTOLIC BLOOD PRESSURE: 130 MMHG | WEIGHT: 315 LBS | TEMPERATURE: 97.2 F | RESPIRATION RATE: 18 BRPM | DIASTOLIC BLOOD PRESSURE: 86 MMHG | HEART RATE: 92 BPM | OXYGEN SATURATION: 97 %

## 2019-10-24 DIAGNOSIS — I25.10 CORONARY ARTERY DISEASE INVOLVING NATIVE CORONARY ARTERY WITHOUT ANGINA PECTORIS, UNSPECIFIED WHETHER NATIVE OR TRANSPLANTED HEART: ICD-10-CM

## 2019-10-24 DIAGNOSIS — N52.9 ERECTILE DYSFUNCTION, UNSPECIFIED ERECTILE DYSFUNCTION TYPE: ICD-10-CM

## 2019-10-24 DIAGNOSIS — M17.0 OSTEOARTHRITIS OF BOTH KNEES, UNSPECIFIED OSTEOARTHRITIS TYPE: ICD-10-CM

## 2019-10-24 DIAGNOSIS — R53.83 OTHER FATIGUE: ICD-10-CM

## 2019-10-24 DIAGNOSIS — N39.41 URGE INCONTINENCE: ICD-10-CM

## 2019-10-24 DIAGNOSIS — E79.0 ELEVATED BLOOD URIC ACID LEVEL: ICD-10-CM

## 2019-10-24 DIAGNOSIS — E66.01 MORBID OBESITY (HCC): ICD-10-CM

## 2019-10-24 DIAGNOSIS — G43.909 MIGRAINE WITHOUT STATUS MIGRAINOSUS, NOT INTRACTABLE, UNSPECIFIED MIGRAINE TYPE: ICD-10-CM

## 2019-10-24 DIAGNOSIS — I10 BENIGN ESSENTIAL HYPERTENSION: Primary | ICD-10-CM

## 2019-10-24 DIAGNOSIS — G47.30 SLEEP APNEA, UNSPECIFIED TYPE: ICD-10-CM

## 2019-10-24 PROBLEM — S61.310A: Status: RESOLVED | Noted: 2018-05-04 | Resolved: 2019-10-24

## 2019-10-24 PROBLEM — Z12.11 COLON CANCER SCREENING: Status: RESOLVED | Noted: 2017-06-19 | Resolved: 2019-10-24

## 2019-10-24 PROBLEM — R10.13 EPIGASTRIC PAIN: Status: RESOLVED | Noted: 2019-07-15 | Resolved: 2019-10-24

## 2019-10-24 PROCEDURE — 99213 OFFICE O/P EST LOW 20 MIN: CPT | Performed by: INTERNAL MEDICINE

## 2019-10-24 PROCEDURE — 99396 PREV VISIT EST AGE 40-64: CPT | Performed by: INTERNAL MEDICINE

## 2019-10-24 RX ORDER — TOLTERODINE 4 MG/1
4 CAPSULE, EXTENDED RELEASE ORAL DAILY
Qty: 30 CAPSULE | Refills: 1 | Status: SHIPPED | OUTPATIENT
Start: 2019-10-24 | End: 2019-12-19

## 2019-10-24 NOTE — PROGRESS NOTES
Subjective   Willi Rayo is a 54 y.o. male and is here for a comprehensive physical exam.     Do you take any herbs or supplements that were not prescribed by a doctor? no  Are you taking calcium supplements? no  Are you taking aspirin daily? no  Patient here for physical also has medical problems to be addressed.  Patient years of morbidly obese.  Weight is 432 pound.  Patient has severe arthritis both knees and a feeling fatigue no energy.  Patient yet to see sleep doctor and the patient did not go to weight loss center as referred in the past.  Patient also complains of urinary urgency unable to wait some time even P independence.  Patient denies any chest pain any short of breath.  Sugar is mildly elevated.Hyperlipidemia stable on medication.    The following portions of the patient's history were reviewed and updated as appropriate: allergies, current medications, past family history, past medical history, past social history, past surgical history and problem list.      Review of Systems   Constitutional: Positive for fatigue.   HENT: Negative.    Eyes: Negative.    Respiratory: Negative.    Cardiovascular: Negative.    Gastrointestinal: Negative.    Endocrine: Negative.    Genitourinary: Positive for urgency.   Musculoskeletal: Negative.    Skin: Negative.    Allergic/Immunologic: Negative.    Neurological: Negative.    Hematological: Negative.    Psychiatric/Behavioral: Negative.    All other systems reviewed and are negative.        Physical Exam   Constitutional: He is oriented to person, place, and time. He appears well-developed and well-nourished.   HENT:   Head: Normocephalic and atraumatic.   Right Ear: External ear normal.   Left Ear: External ear normal.   Nose: Nose normal.   Mouth/Throat: Oropharynx is clear and moist.   Eyes: Conjunctivae and EOM are normal. Pupils are equal, round, and reactive to light.   Neck: Normal range of motion. Neck supple. No thyromegaly present.   Cardiovascular:  Normal rate, regular rhythm, normal heart sounds and intact distal pulses.   Pulmonary/Chest: Effort normal and breath sounds normal.   Abdominal: Soft. Bowel sounds are normal.   Musculoskeletal: Normal range of motion.   Neurological: He is alert and oriented to person, place, and time. He has normal reflexes.   Skin: Skin is warm and dry.   Psychiatric: He has a normal mood and affect. His behavior is normal. Judgment and thought content normal.   Nursing note and vitals reviewed.      All  tests have been reviewed.    Assessment/Plan          1. Patient Counseling:  --Nutrition: Stressed importance of moderation in sodium/caffeine intake, saturated fat and cholesterol, caloric balance, sufficient intake of fresh fruits, vegetables, fiber, calcium and iron.  --Exercise: Stressed the importance of regular exercise.   --Injury prevention: Discussed safety belts, safety helmets, smoke detector, smoking near bedding or upholstery.   --Dental health: Discussed importance of regular tooth brushing, flossing, and dental visits.  --Immunizations reviewed.  --Discussed benefits of screening colonoscopy.  --After hours service discussed with patient    2. Discussed the patient's BMI with him.             CAD soa with exertion: stress test abnormal and cotninue ASA 81mg ,normal Cath with mild plaque, continue lipitor do lab  HTN continue toprol   migraine stable now  plantar fasciitis continue mobic rest cold decompression  Morbid obesity good diet refer to weight loss center  vitD low continue vitD3 1000iu daily  Colon 7/18/19 repeat 5 years. EGD 7/18/19:  Insomnia daytime sleepy, follow up with sleep study doctor  HLD diet continue medicinef  Prediabetes, 6.1  Knee oa follow up with ortho  Ed do lab  Fatigue, do lab  Urge incont, start detrol LA and do UA      1 mo after lab

## 2019-10-24 NOTE — TELEPHONE ENCOUNTER
"Patient called in requesting his lab orders be sent to [facility] to have completed. [facility] \"[facility]\" [facility] so he does not want to go there. Patient would like to be notified once labs have been sent over.   "

## 2019-10-24 NOTE — TELEPHONE ENCOUNTER
Called pt and notified that he can go to the hospital and get his labs done there, notified him they will already have the orders in the computer and can see them since they have Epic

## 2019-10-28 DIAGNOSIS — I25.10 CORONARY ARTERY DISEASE INVOLVING NATIVE CORONARY ARTERY WITHOUT ANGINA PECTORIS, UNSPECIFIED WHETHER NATIVE OR TRANSPLANTED HEART: ICD-10-CM

## 2019-10-28 RX ORDER — ATORVASTATIN CALCIUM 40 MG/1
40 TABLET, FILM COATED ORAL DAILY
Qty: 90 TABLET | Refills: 1 | Status: SHIPPED | OUTPATIENT
Start: 2019-10-28 | End: 2020-04-27

## 2019-11-05 ENCOUNTER — OFFICE VISIT (OUTPATIENT)
Dept: PULMONOLOGY | Facility: CLINIC | Age: 54
End: 2019-11-05

## 2019-11-05 VITALS
OXYGEN SATURATION: 95 % | WEIGHT: 315 LBS | HEIGHT: 73 IN | HEART RATE: 79 BPM | BODY MASS INDEX: 41.75 KG/M2 | SYSTOLIC BLOOD PRESSURE: 124 MMHG | RESPIRATION RATE: 18 BRPM | DIASTOLIC BLOOD PRESSURE: 82 MMHG

## 2019-11-05 DIAGNOSIS — R06.83 SNORING: ICD-10-CM

## 2019-11-05 DIAGNOSIS — G47.8 SLEEP TALKING: ICD-10-CM

## 2019-11-05 DIAGNOSIS — E66.01 MORBID OBESITY, UNSPECIFIED OBESITY TYPE (HCC): ICD-10-CM

## 2019-11-05 DIAGNOSIS — G47.19 EXCESSIVE DAYTIME SLEEPINESS: ICD-10-CM

## 2019-11-05 DIAGNOSIS — G47.33 OBSTRUCTIVE SLEEP APNEA: Primary | ICD-10-CM

## 2019-11-05 DIAGNOSIS — F51.5 NIGHTMARES: ICD-10-CM

## 2019-11-05 PROCEDURE — 99244 OFF/OP CNSLTJ NEW/EST MOD 40: CPT | Performed by: INTERNAL MEDICINE

## 2019-11-05 NOTE — PROGRESS NOTES
CONSULT NOTE    Requested by:   Apryl Olivas, *   Paul Verduzco MD      Chief Complaint   Patient presents with   • Consult   • Sleeping Problem       Subjective:  Willi Rayo is a 54 y.o. male.     History of Present Illness   Patient came in today for evaluation of possible sleep apnea. Patient says that for the past few years he snores loudly and has woken up in the middle of the night gasping for breath and sometimes with a choking sensation. Also, the patient's family notes that he has occasional pauses in the breathing.     he feels that he doesn't get restful night sleep and his quality has diminished considerably. he does feel sleepy watching TV and reading a book.      he is not complaining of occasional headaches. Patient mentions having occasional nights when he has nightmares & when he sleep talks.     There is no known family history of sleep apnea     Patient suffers from hypertension.    he drinks 2-3 cups/cans of caffeinated drinks per day.      The following portions of the patient's history were reviewed and updated as appropriate: allergies, current medications, past family history, past medical history, past social history and past surgical history.    Review of Systems   Constitutional: Positive for fatigue. Negative for chills and fever.   HENT: Negative for sinus pressure, sneezing and sore throat.    Respiratory: Positive for shortness of breath. Negative for cough, chest tightness and wheezing.    Cardiovascular: Positive for leg swelling. Negative for palpitations.   Psychiatric/Behavioral: Positive for sleep disturbance.   All other systems reviewed and are negative.      Past Medical History:   Diagnosis Date   • Abdominal bloating    • Abdominal pain    • Arthritis    • Bilateral knee pain    • Constipation    • Enlarged heart    • GERD (gastroesophageal reflux disease)    • Gout of big toe    • History of chest pain    • History of echocardiogram    • History of nuclear  "stress test    • Hyperlipidemia    • Hypertension    • Migraines    • Morbid obesity (CMS/HCC)    • RLS (restless legs syndrome)    • Sinusitis    • Sleep apnea     Non-compliant with pap   • Urinary urgency    • Wears glasses        Social History     Tobacco Use   • Smoking status: Never Smoker   • Smokeless tobacco: Never Used   Substance Use Topics   • Alcohol use: No     Comment: social         Objective:  Visit Vitals  /82   Pulse 79   Resp 18   Ht 185.4 cm (72.99\")   Wt (!) 197 kg (434 lb)   SpO2 95%   BMI 57.27 kg/m²       Physical Exam   Constitutional: He is oriented to person, place, and time. He appears well-developed and well-nourished.   HENT:   Head: Atraumatic.   Crowded Oropharynx.    Eyes: EOM are normal. Pupils are equal, round, and reactive to light.   Neck: No JVD present. No tracheal deviation present. No thyromegaly present.   Increased adipose tissue.    Cardiovascular: Normal rate and regular rhythm.   Pulmonary/Chest: Effort normal and breath sounds normal. No respiratory distress. He has no wheezes.   Musculoskeletal: Normal range of motion. He exhibits no edema.   Gait was normal.   Neurological: He is alert and oriented to person, place, and time.   Skin: Skin is warm and dry.   Psychiatric: He has a normal mood and affect. His behavior is normal.   Vitals reviewed.        Assessment/Plan:  Willi was seen today for consult and sleeping problem.    Diagnoses and all orders for this visit:    Obstructive sleep apnea  -     Home Sleep Study; Future    Snoring  -     Home Sleep Study; Future    Excessive daytime sleepiness  -     Home Sleep Study; Future    Morbid obesity, unspecified obesity type (CMS/HCC)  -     Home Sleep Study; Future    Nightmares    Sleep talking        Return in about 3 months (around 2/5/2020) for Give Sleep quest, SleepONLY/Lissette, ....Also 8 mths w/ Dr. Main.    DISCUSSION(if any):  Laboratory workup was also reviewed which showed   Lab Results   Component " Value Date    CO2 24.8 09/20/2019     ===========================  ===========================    Sleep questionnaire was provided to the patient    The pathophysiology of sleep apnea was discussed, with the patient.     We will encourage him to schedule the sleep study soon.     The patient was made aware of the limitation of the home sleep study, whereby it may underestimate the true AHI and also carries a low sensitivity.  I have informed him that even if the home sleep study is negative, we may suggest an in lab sleep study to completely and definitively rule out/in sleep apnea.  The patient has understood.  This was communicated to the patient, in case home study is to be requested.    The patient is agreeable to try CPAP/BiPAP, if needed.     Patient was educated on good sleep hygiene measures and voiced understanding of the same.     Patient was given reading material regarding sleep apnea    Patient was counseled regarding weight loss.         Dictated utilizing Dragon dictation.    This document was electronically signed by Celeste Main MD on 11/05/19 at 10:35 AM

## 2019-11-11 ENCOUNTER — HOSPITAL ENCOUNTER (OUTPATIENT)
Dept: SLEEP MEDICINE | Facility: HOSPITAL | Age: 54
Discharge: HOME | End: 2019-11-11
Admitting: INTERNAL MEDICINE

## 2019-11-11 DIAGNOSIS — E66.01 MORBID OBESITY, UNSPECIFIED OBESITY TYPE (HCC): ICD-10-CM

## 2019-11-11 DIAGNOSIS — G47.19 EXCESSIVE DAYTIME SLEEPINESS: ICD-10-CM

## 2019-11-11 DIAGNOSIS — G47.33 OBSTRUCTIVE SLEEP APNEA: ICD-10-CM

## 2019-11-11 DIAGNOSIS — R06.83 SNORING: ICD-10-CM

## 2019-11-11 PROCEDURE — 95806 SLEEP STUDY UNATT&RESP EFFT: CPT | Performed by: INTERNAL MEDICINE

## 2019-11-11 PROCEDURE — 95806 SLEEP STUDY UNATT&RESP EFFT: CPT

## 2019-11-19 DIAGNOSIS — G47.33 OSA (OBSTRUCTIVE SLEEP APNEA): Primary | ICD-10-CM

## 2019-11-25 ENCOUNTER — APPOINTMENT (OUTPATIENT)
Dept: LAB | Facility: HOSPITAL | Age: 54
End: 2019-11-25

## 2019-11-25 LAB
BILIRUB UR QL STRIP: NEGATIVE
CLARITY UR: CLEAR
COLOR UR: YELLOW
GLUCOSE UR STRIP-MCNC: NEGATIVE MG/DL
HGB UR QL STRIP.AUTO: NEGATIVE
KETONES UR QL STRIP: NEGATIVE
LEUKOCYTE ESTERASE UR QL STRIP.AUTO: NEGATIVE
NITRITE UR QL STRIP: NEGATIVE
PH UR STRIP.AUTO: 5.5 [PH] (ref 5–8)
PROLACTIN SERPL-MCNC: 9.77 NG/ML (ref 4.04–15.2)
PROT UR QL STRIP: NEGATIVE
SP GR UR STRIP: 1.02 (ref 1–1.03)
URATE SERPL-MCNC: 8.3 MG/DL (ref 3.4–7)
UROBILINOGEN UR QL STRIP: NORMAL

## 2019-11-25 PROCEDURE — 84402 ASSAY OF FREE TESTOSTERONE: CPT | Performed by: INTERNAL MEDICINE

## 2019-11-25 PROCEDURE — 84146 ASSAY OF PROLACTIN: CPT | Performed by: INTERNAL MEDICINE

## 2019-11-25 PROCEDURE — 84550 ASSAY OF BLOOD/URIC ACID: CPT | Performed by: INTERNAL MEDICINE

## 2019-11-25 PROCEDURE — 84403 ASSAY OF TOTAL TESTOSTERONE: CPT | Performed by: INTERNAL MEDICINE

## 2019-11-25 PROCEDURE — 81003 URINALYSIS AUTO W/O SCOPE: CPT | Performed by: INTERNAL MEDICINE

## 2019-11-25 PROCEDURE — 36415 COLL VENOUS BLD VENIPUNCTURE: CPT | Performed by: INTERNAL MEDICINE

## 2019-11-26 LAB
TESTOST FREE SERPL-MCNC: 7.1 PG/ML (ref 7.2–24)
TESTOST SERPL-MCNC: 194 NG/DL (ref 264–916)

## 2019-12-03 ENCOUNTER — OFFICE VISIT (OUTPATIENT)
Dept: INTERNAL MEDICINE | Facility: CLINIC | Age: 54
End: 2019-12-03

## 2019-12-03 VITALS
SYSTOLIC BLOOD PRESSURE: 124 MMHG | DIASTOLIC BLOOD PRESSURE: 80 MMHG | OXYGEN SATURATION: 95 % | BODY MASS INDEX: 41.75 KG/M2 | TEMPERATURE: 97.6 F | HEART RATE: 95 BPM | HEIGHT: 73 IN | RESPIRATION RATE: 18 BRPM | WEIGHT: 315 LBS

## 2019-12-03 DIAGNOSIS — E66.01 MORBID OBESITY (HCC): ICD-10-CM

## 2019-12-03 DIAGNOSIS — I25.10 CORONARY ARTERY DISEASE INVOLVING NATIVE CORONARY ARTERY WITHOUT ANGINA PECTORIS, UNSPECIFIED WHETHER NATIVE OR TRANSPLANTED HEART: ICD-10-CM

## 2019-12-03 DIAGNOSIS — M17.0 OSTEOARTHRITIS OF BOTH KNEES, UNSPECIFIED OSTEOARTHRITIS TYPE: ICD-10-CM

## 2019-12-03 DIAGNOSIS — G47.30 SLEEP APNEA, UNSPECIFIED TYPE: ICD-10-CM

## 2019-12-03 DIAGNOSIS — N52.9 ERECTILE DYSFUNCTION, UNSPECIFIED ERECTILE DYSFUNCTION TYPE: ICD-10-CM

## 2019-12-03 DIAGNOSIS — R53.83 OTHER FATIGUE: ICD-10-CM

## 2019-12-03 DIAGNOSIS — G43.909 MIGRAINE WITHOUT STATUS MIGRAINOSUS, NOT INTRACTABLE, UNSPECIFIED MIGRAINE TYPE: ICD-10-CM

## 2019-12-03 DIAGNOSIS — E79.0 ELEVATED BLOOD URIC ACID LEVEL: ICD-10-CM

## 2019-12-03 DIAGNOSIS — I10 BENIGN ESSENTIAL HYPERTENSION: Primary | ICD-10-CM

## 2019-12-03 DIAGNOSIS — E29.1 HYPOGONADISM IN MALE: ICD-10-CM

## 2019-12-03 PROCEDURE — 99214 OFFICE O/P EST MOD 30 MIN: CPT | Performed by: INTERNAL MEDICINE

## 2019-12-03 RX ORDER — TESTOSTERONE 30 MG/1.5ML
SOLUTION TOPICAL
Qty: 90 ML | Refills: 5 | Status: SHIPPED | OUTPATIENT
Start: 2019-12-03 | End: 2022-08-04

## 2019-12-03 RX ORDER — ALLOPURINOL 300 MG/1
300 TABLET ORAL DAILY
Qty: 90 TABLET | Refills: 1 | Status: SHIPPED | OUTPATIENT
Start: 2019-12-03 | End: 2020-06-11

## 2019-12-03 NOTE — PROGRESS NOTES
Subjective   Willi Rayo is a 54 y.o. male.     Chief Complaint   Patient presents with   • Follow-up     1 mo f/u labs        History of Present Illness   Patient here for follow-up.  Blood pressure stable.  Medication.  Hyperlipidemia stable on medication.  Uric acid is high.  Testosterone is low.  Coronary artery disease is stable.  The sleep apnea recently diagnosed the patient yet to use CPAP.  Weight is still very high.  Arthritis is still.    Current Outpatient Medications:   •  atorvastatin (LIPITOR) 40 MG tablet, Take 1 tablet by mouth Daily., Disp: 90 tablet, Rfl: 1  •  diclofenac (VOLTAREN) 50 MG EC tablet, Take 1 tablet by mouth 2 (Two) Times a Day As Needed (pain)., Disp: 60 tablet, Rfl: 0  •  metoprolol succinate XL (TOPROL-XL) 50 MG 24 hr tablet, Take 1 tablet by mouth Daily., Disp: 90 tablet, Rfl: 1  •  NITROSTAT 0.4 MG SL tablet, PLACE NOHELIA, Disp: , Rfl: 0  •  ondansetron (ZOFRAN) 4 MG tablet, Take 1 tablet by mouth Every 8 (Eight) Hours As Needed for Nausea or Vomiting., Disp: 21 tablet, Rfl: 0  •  polyethylene glycol (MIRALAX) powder powder, Clear liquid diet day prior to colonoscopy. Mix with 64oz of clear liquid. Drink 8oz every 10-15 min until consumed., Disp: 238 g, Rfl: 0  •  raNITIdine (ZANTAC) 150 MG tablet, Take 1 tablet by mouth 2 (Two) Times a Day., Disp: 60 tablet, Rfl: 0  •  SUMAtriptan (IMITREX) 50 MG tablet, Take 1 tablet by mouth 1 (One) Time As Needed for Migraine for up to 1 dose., Disp: 15 tablet, Rfl: 1  •  tolterodine LA (DETROL LA) 4 MG 24 hr capsule, Take 1 capsule by mouth Daily., Disp: 30 capsule, Rfl: 1  •  allopurinol (ZYLOPRIM) 300 MG tablet, Take 1 tablet by mouth Daily., Disp: 90 tablet, Rfl: 1  •  Testosterone (AXIRON) 30 MG/ACT solution, 1 hattie each axilla daily, Disp: 90 mL, Rfl: 5    The following portions of the patient's history were reviewed and updated as appropriate: allergies, current medications, past family history, past medical history, past social  history, past surgical history and problem list.    Review of Systems   Constitutional: Negative.    Respiratory: Negative.    Cardiovascular: Negative.    Gastrointestinal: Negative.    Musculoskeletal: Negative.    Skin: Negative.    Neurological: Negative.    Psychiatric/Behavioral: Positive for sleep disturbance.       Objective   Physical Exam   Constitutional: He is oriented to person, place, and time. He appears well-developed and well-nourished.   Neck: Neck supple.   Cardiovascular: Normal rate, regular rhythm and normal heart sounds.   Pulmonary/Chest: Effort normal and breath sounds normal.   Abdominal: Bowel sounds are normal.   Neurological: He is alert and oriented to person, place, and time.   Skin: Skin is warm.   Psychiatric: He has a normal mood and affect.       All tests have been reviewed.    Assessment/Plan   Diagnoses and all orders for this visit:    Benign essential hypertension continue medication    Migraine without status migrainosus, not intractable, unspecified migraine type    Coronary artery disease involving native coronary artery without angina pectoris, unspecified whether native or transplanted heart continue monitor    Sleep apnea, unspecified type in need of CPAP    Morbid obesity (CMS/HCC) need a good diet weight loss    Osteoarthritis of both knees, unspecified osteoarthritis type    Elevated blood uric acid level  -     allopurinol (ZYLOPRIM) 300 MG tablet; Take 1 tablet by mouth Daily.  -     Uric Acid    Other fatigue initiate testosterone    Erectile dysfunction, unspecified erectile dysfunction type  -     Testosterone (AXIRON) 30 MG/ACT solution; 1 hattie each axilla daily    Hypogonadism in male  -     Testosterone (AXIRON) 30 MG/ACT solution; 1 hattie each axilla daily  -     CBC & Differential  -     Comprehensive Metabolic Panel  -     PSA DIAGNOSTIC  -     Testosterone        Follow-up in 3 months after blood tests

## 2019-12-19 DIAGNOSIS — N39.41 URGE INCONTINENCE: ICD-10-CM

## 2019-12-19 RX ORDER — TOLTERODINE 4 MG/1
4 CAPSULE, EXTENDED RELEASE ORAL DAILY
Qty: 90 CAPSULE | Refills: 1 | Status: SHIPPED | OUTPATIENT
Start: 2019-12-19 | End: 2020-06-19

## 2020-01-21 DIAGNOSIS — I10 BENIGN ESSENTIAL HYPERTENSION: ICD-10-CM

## 2020-01-21 RX ORDER — METOPROLOL SUCCINATE 50 MG/1
50 TABLET, EXTENDED RELEASE ORAL DAILY
Qty: 90 TABLET | Refills: 1 | Status: SHIPPED | OUTPATIENT
Start: 2020-01-21 | End: 2020-08-10 | Stop reason: SDUPTHER

## 2020-02-13 ENCOUNTER — OFFICE VISIT (OUTPATIENT)
Dept: PULMONOLOGY | Facility: CLINIC | Age: 55
End: 2020-02-13

## 2020-02-13 VITALS
OXYGEN SATURATION: 96 % | WEIGHT: 315 LBS | RESPIRATION RATE: 18 BRPM | DIASTOLIC BLOOD PRESSURE: 74 MMHG | HEIGHT: 73 IN | HEART RATE: 95 BPM | SYSTOLIC BLOOD PRESSURE: 126 MMHG | BODY MASS INDEX: 41.75 KG/M2

## 2020-02-13 DIAGNOSIS — G47.33 OSA (OBSTRUCTIVE SLEEP APNEA): Primary | ICD-10-CM

## 2020-02-13 DIAGNOSIS — E66.01 MORBID OBESITY, UNSPECIFIED OBESITY TYPE (HCC): ICD-10-CM

## 2020-02-13 DIAGNOSIS — G47.19 EXCESSIVE DAYTIME SLEEPINESS: ICD-10-CM

## 2020-02-13 PROCEDURE — 99213 OFFICE O/P EST LOW 20 MIN: CPT | Performed by: NURSE PRACTITIONER

## 2020-02-13 NOTE — PROGRESS NOTES
"Chief Complaint   Patient presents with   • Follow-up   • Sleeping Problem         Subjective   Willi Rayo is a 54 y.o. male.     History of Present Illness   The patient comes in today for follow-up of obstructive sleep apnea.    I reviewed his sleep study and discussed the results with him today.  The sleep study revealed severe sleep apnea with an apnea hypopnea index of 99 per hour.  his apnea-hypopnea index was worse in supine position.    He has been set up with AutoPAP at a pressure of 8/16.  he is not having any issues using the machine. Feeling completely different. He is no longing napping, falling asleep driving or at work.     The following portions of the patient's history were reviewed and updated as appropriate: allergies, current medications, past family history, past medical history, past social history and past surgical history.    Review of Systems   HENT: Negative for rhinorrhea, sinus pressure, sneezing and sore throat.    Respiratory: Positive for cough. Negative for shortness of breath and wheezing.        Objective   Visit Vitals  /74   Pulse 95   Resp 18   Ht 185.4 cm (72.99\")   Wt (!) 195 kg (430 lb)   SpO2 96%   BMI 56.75 kg/m²     Physical Exam   Constitutional: He is oriented to person, place, and time. He appears well-developed and well-nourished.   HENT:   Head: Normocephalic and atraumatic.   Crowded oropharynx. Mallempati IV/IV.   Neck:   Increased adipose tissue.   Abdominal:   Obese.   Musculoskeletal:   Gait was normal.   Neurological: He is alert and oriented to person, place, and time.   Psychiatric: He has a normal mood and affect.   Vitals reviewed.          Assessment/Plan   Willi was seen today for follow-up and sleeping problem.    Diagnoses and all orders for this visit:    VALERIE (obstructive sleep apnea)    Excessive daytime sleepiness    Morbid obesity, unspecified obesity type (CMS/HCC)           Return for Keep f/u in July with Doc..    DISCUSSION (if " any):  Continue treatment with AutoPAP at a pressure of 8/16, with a full-face mask.    Patient's compliance data was reviewed and the compliance is greater than 80%.    Humidification setup, hose and mask care discussed.    Weight loss advised.    Use every night for at least 4 hours stressed.      Dictated utilizing Dragon dictation.    This document was electronically signed by KAIT Nation February 13, 2020  1:33 PM

## 2020-04-25 DIAGNOSIS — I25.10 CORONARY ARTERY DISEASE INVOLVING NATIVE CORONARY ARTERY WITHOUT ANGINA PECTORIS, UNSPECIFIED WHETHER NATIVE OR TRANSPLANTED HEART: ICD-10-CM

## 2020-04-27 RX ORDER — ATORVASTATIN CALCIUM 40 MG/1
40 TABLET, FILM COATED ORAL DAILY
Qty: 90 TABLET | Refills: 1 | Status: SHIPPED | OUTPATIENT
Start: 2020-04-27 | End: 2020-10-26

## 2020-05-12 ENCOUNTER — APPOINTMENT (OUTPATIENT)
Dept: LAB | Facility: HOSPITAL | Age: 55
End: 2020-05-12

## 2020-05-12 ENCOUNTER — OFFICE VISIT (OUTPATIENT)
Dept: INTERNAL MEDICINE | Facility: CLINIC | Age: 55
End: 2020-05-12

## 2020-05-12 VITALS
SYSTOLIC BLOOD PRESSURE: 130 MMHG | OXYGEN SATURATION: 97 % | WEIGHT: 315 LBS | HEART RATE: 78 BPM | BODY MASS INDEX: 41.75 KG/M2 | TEMPERATURE: 99.8 F | HEIGHT: 73 IN | DIASTOLIC BLOOD PRESSURE: 80 MMHG

## 2020-05-12 DIAGNOSIS — N52.9 ERECTILE DYSFUNCTION, UNSPECIFIED ERECTILE DYSFUNCTION TYPE: ICD-10-CM

## 2020-05-12 DIAGNOSIS — R53.83 OTHER FATIGUE: ICD-10-CM

## 2020-05-12 DIAGNOSIS — E79.0 ELEVATED BLOOD URIC ACID LEVEL: ICD-10-CM

## 2020-05-12 DIAGNOSIS — I25.10 CORONARY ARTERY DISEASE INVOLVING NATIVE CORONARY ARTERY WITHOUT ANGINA PECTORIS, UNSPECIFIED WHETHER NATIVE OR TRANSPLANTED HEART: ICD-10-CM

## 2020-05-12 DIAGNOSIS — I10 BENIGN ESSENTIAL HYPERTENSION: Primary | ICD-10-CM

## 2020-05-12 DIAGNOSIS — G47.30 SLEEP APNEA, UNSPECIFIED TYPE: ICD-10-CM

## 2020-05-12 DIAGNOSIS — E66.01 MORBID OBESITY (HCC): ICD-10-CM

## 2020-05-12 DIAGNOSIS — R55 SYNCOPE, NEAR: ICD-10-CM

## 2020-05-12 DIAGNOSIS — M17.0 OSTEOARTHRITIS OF BOTH KNEES, UNSPECIFIED OSTEOARTHRITIS TYPE: ICD-10-CM

## 2020-05-12 DIAGNOSIS — L91.8 SKIN TAGS, MULTIPLE ACQUIRED: ICD-10-CM

## 2020-05-12 DIAGNOSIS — G43.909 MIGRAINE WITHOUT STATUS MIGRAINOSUS, NOT INTRACTABLE, UNSPECIFIED MIGRAINE TYPE: ICD-10-CM

## 2020-05-12 LAB
ALBUMIN SERPL-MCNC: 3.8 G/DL (ref 3.5–5.2)
ALBUMIN/GLOB SERPL: 1.1 G/DL
ALP SERPL-CCNC: 107 U/L (ref 39–117)
ALT SERPL W P-5'-P-CCNC: 36 U/L (ref 1–41)
ANION GAP SERPL CALCULATED.3IONS-SCNC: 11.2 MMOL/L (ref 5–15)
AST SERPL-CCNC: 22 U/L (ref 1–40)
BASOPHILS # BLD AUTO: 0.04 10*3/MM3 (ref 0–0.2)
BASOPHILS NFR BLD AUTO: 0.6 % (ref 0–1.5)
BILIRUB SERPL-MCNC: 0.6 MG/DL (ref 0.2–1.2)
BUN BLD-MCNC: 17 MG/DL (ref 6–20)
BUN/CREAT SERPL: 16.7 (ref 7–25)
CALCIUM SPEC-SCNC: 9.2 MG/DL (ref 8.6–10.5)
CHLORIDE SERPL-SCNC: 103 MMOL/L (ref 98–107)
CO2 SERPL-SCNC: 21.8 MMOL/L (ref 22–29)
CREAT BLD-MCNC: 1.02 MG/DL (ref 0.76–1.27)
DEPRECATED RDW RBC AUTO: 42.3 FL (ref 37–54)
EOSINOPHIL # BLD AUTO: 0.3 10*3/MM3 (ref 0–0.4)
EOSINOPHIL NFR BLD AUTO: 4.3 % (ref 0.3–6.2)
ERYTHROCYTE [DISTWIDTH] IN BLOOD BY AUTOMATED COUNT: 13.6 % (ref 12.3–15.4)
GFR SERPL CREATININE-BSD FRML MDRD: 76 ML/MIN/1.73
GLOBULIN UR ELPH-MCNC: 3.4 GM/DL
GLUCOSE BLD-MCNC: 114 MG/DL (ref 65–99)
HCT VFR BLD AUTO: 48.2 % (ref 37.5–51)
HGB BLD-MCNC: 16.5 G/DL (ref 13–17.7)
IMM GRANULOCYTES # BLD AUTO: 0.03 10*3/MM3 (ref 0–0.05)
IMM GRANULOCYTES NFR BLD AUTO: 0.4 % (ref 0–0.5)
LYMPHOCYTES # BLD AUTO: 1.84 10*3/MM3 (ref 0.7–3.1)
LYMPHOCYTES NFR BLD AUTO: 26.2 % (ref 19.6–45.3)
MCH RBC QN AUTO: 29.4 PG (ref 26.6–33)
MCHC RBC AUTO-ENTMCNC: 34.2 G/DL (ref 31.5–35.7)
MCV RBC AUTO: 85.9 FL (ref 79–97)
MONOCYTES # BLD AUTO: 0.56 10*3/MM3 (ref 0.1–0.9)
MONOCYTES NFR BLD AUTO: 8 % (ref 5–12)
NEUTROPHILS # BLD AUTO: 4.26 10*3/MM3 (ref 1.7–7)
NEUTROPHILS NFR BLD AUTO: 60.5 % (ref 42.7–76)
NRBC BLD AUTO-RTO: 0 /100 WBC (ref 0–0.2)
PLATELET # BLD AUTO: 227 10*3/MM3 (ref 140–450)
PMV BLD AUTO: 10.4 FL (ref 6–12)
POTASSIUM BLD-SCNC: 4.7 MMOL/L (ref 3.5–5.2)
PROT SERPL-MCNC: 7.2 G/DL (ref 6–8.5)
PSA SERPL-MCNC: 1.39 NG/ML (ref 0–4)
RBC # BLD AUTO: 5.61 10*6/MM3 (ref 4.14–5.8)
SODIUM BLD-SCNC: 136 MMOL/L (ref 136–145)
TESTOST SERPL-MCNC: 508 NG/DL (ref 193–740)
URATE SERPL-MCNC: 4.1 MG/DL (ref 3.4–7)
WBC NRBC COR # BLD: 7.03 10*3/MM3 (ref 3.4–10.8)

## 2020-05-12 PROCEDURE — 84403 ASSAY OF TOTAL TESTOSTERONE: CPT | Performed by: INTERNAL MEDICINE

## 2020-05-12 PROCEDURE — 36415 COLL VENOUS BLD VENIPUNCTURE: CPT | Performed by: INTERNAL MEDICINE

## 2020-05-12 PROCEDURE — 99214 OFFICE O/P EST MOD 30 MIN: CPT | Performed by: INTERNAL MEDICINE

## 2020-05-12 PROCEDURE — 84550 ASSAY OF BLOOD/URIC ACID: CPT | Performed by: INTERNAL MEDICINE

## 2020-05-12 PROCEDURE — 84153 ASSAY OF PSA TOTAL: CPT | Performed by: INTERNAL MEDICINE

## 2020-05-12 PROCEDURE — 85025 COMPLETE CBC W/AUTO DIFF WBC: CPT | Performed by: INTERNAL MEDICINE

## 2020-05-12 PROCEDURE — 93225 XTRNL ECG REC<48 HRS REC: CPT | Performed by: INTERNAL MEDICINE

## 2020-05-12 PROCEDURE — 93000 ELECTROCARDIOGRAM COMPLETE: CPT | Performed by: INTERNAL MEDICINE

## 2020-05-12 PROCEDURE — 80053 COMPREHEN METABOLIC PANEL: CPT | Performed by: INTERNAL MEDICINE

## 2020-05-12 RX ORDER — MELOXICAM 15 MG/1
15 TABLET ORAL DAILY
Qty: 30 TABLET | Refills: 3 | Status: SHIPPED | OUTPATIENT
Start: 2020-05-12 | End: 2020-09-16

## 2020-05-12 NOTE — PROGRESS NOTES
Subjective   Willi Rayo is a 54 y.o. male.     Chief Complaint   Patient presents with   • Dizziness     driving to work - felt like he was going to pass out, vision changes. discuss mobic        History of Present Illness   Driving to work last week suddenly felt lightheaded and loosing vision for seconds, no LOS, no sz activities. No soa no chest pain or palpitation, no HA , no n/v/d/c, no HA, hx BP high and normal now, still morbid obesity, sleep apnea on cpap stable, no daytime sleepiness, knee pain mobic helps    Current Outpatient Medications:   •  allopurinol (ZYLOPRIM) 300 MG tablet, Take 1 tablet by mouth Daily., Disp: 90 tablet, Rfl: 1  •  atorvastatin (LIPITOR) 40 MG tablet, TAKE 1 TABLET BY MOUTH DAILY, Disp: 90 tablet, Rfl: 1  •  metoprolol succinate XL (TOPROL-XL) 50 MG 24 hr tablet, TAKE 1 TABLET BY MOUTH DAILY, Disp: 90 tablet, Rfl: 1  •  NITROSTAT 0.4 MG SL tablet, PLACE NOHELIA, Disp: , Rfl: 0  •  ondansetron (ZOFRAN) 4 MG tablet, Take 1 tablet by mouth Every 8 (Eight) Hours As Needed for Nausea or Vomiting., Disp: 21 tablet, Rfl: 0  •  polyethylene glycol (MIRALAX) powder powder, Clear liquid diet day prior to colonoscopy. Mix with 64oz of clear liquid. Drink 8oz every 10-15 min until consumed., Disp: 238 g, Rfl: 0  •  SUMAtriptan (IMITREX) 50 MG tablet, Take 1 tablet by mouth 1 (One) Time As Needed for Migraine for up to 1 dose., Disp: 15 tablet, Rfl: 1  •  Testosterone (AXIRON) 30 MG/ACT solution, 1 hattie each axilla daily, Disp: 90 mL, Rfl: 5  •  tolterodine LA (DETROL LA) 4 MG 24 hr capsule, TAKE 1 CAPSULE BY MOUTH DAILY, Disp: 90 capsule, Rfl: 1  •  meloxicam (MOBIC) 15 MG tablet, Take 1 tablet by mouth Daily., Disp: 30 tablet, Rfl: 3    The following portions of the patient's history were reviewed and updated as appropriate: allergies, current medications, past family history, past medical history, past social history, past surgical history and problem list.    Review of Systems    Constitutional: Negative.    Respiratory: Negative.    Cardiovascular: Negative.    Gastrointestinal: Negative.    Musculoskeletal: Negative.    Skin: Negative.    Neurological: Negative.    Psychiatric/Behavioral: Negative.        Objective   Physical Exam   Constitutional: He is oriented to person, place, and time. He appears well-nourished.   Neck: Neck supple.   Cardiovascular: Normal rate, regular rhythm and normal heart sounds.   Pulmonary/Chest: Effort normal and breath sounds normal.   Abdominal: Bowel sounds are normal.   Neurological: He is alert and oriented to person, place, and time.   Skin: Skin is warm.   Psychiatric: He has a normal mood and affect.       All tests have been reviewed.    Assessment/Plan   Diagnoses and all orders for this visit:    Benign essential hypertension continue medication    Migraine without status migrainosus, not intractable, unspecified migraine type denies pain    Coronary artery disease involving native coronary artery without angina pectoris, unspecified whether native or transplanted heart stable now catheter 2016 was normal    Sleep apnea, continue CPAP    Morbid obesity (CMS/HCC) still a problem unable to lose weight    Osteoarthritis of both knees, unspecified osteoarthritis type initiate  -     meloxicam (MOBIC) 15 MG tablet; Take 1 tablet by mouth Daily.    Elevated blood uric acid level  pending blood tests    Other fatigue pending lab results    Erectile dysfunction, unspecified erectile dysfunction type    Skin tags, multiple acquired patient going to schedule    Syncope, near  -     CT Head Without Contrast  -     US Carotid Bilateral  -     Holter monitor - 24 hour  -     Adult Transthoracic Echo Complete W/ Cont if Necessary Per Protocol  -     ECG 12 Lead      1 week follow up and labs    ECG 12 Lead  Date/Time: 5/12/2020 4:56 PM  Performed by: Paul Verduzco MD  Authorized by: Paul Verduzco MD   Comparison: not compared with previous ECG   Rhythm:  sinus rhythm  Rate: normal  Conduction: conduction normal  ST Segments: ST segments normal  QRS axis: normal  Other: no other findings    Clinical impression: normal ECG

## 2020-05-13 ENCOUNTER — CLINICAL SUPPORT (OUTPATIENT)
Dept: INTERNAL MEDICINE | Facility: CLINIC | Age: 55
End: 2020-05-13

## 2020-05-13 DIAGNOSIS — I10 BENIGN ESSENTIAL HYPERTENSION: ICD-10-CM

## 2020-05-13 DIAGNOSIS — R55 NEAR SYNCOPE: ICD-10-CM

## 2020-05-18 ENCOUNTER — HOSPITAL ENCOUNTER (OUTPATIENT)
Dept: ULTRASOUND IMAGING | Facility: HOSPITAL | Age: 55
Discharge: HOME | End: 2020-05-18

## 2020-05-18 ENCOUNTER — HOSPITAL ENCOUNTER (OUTPATIENT)
Dept: CT IMAGING | Facility: HOSPITAL | Age: 55
Discharge: HOME | End: 2020-05-18

## 2020-05-18 ENCOUNTER — HOSPITAL ENCOUNTER (OUTPATIENT)
Dept: CARDIOLOGY | Facility: HOSPITAL | Age: 55
Discharge: HOME | End: 2020-05-18
Admitting: INTERNAL MEDICINE

## 2020-05-18 LAB
MAXIMAL PREDICTED HEART RATE: 165 BPM
STRESS TARGET HR: 140 BPM

## 2020-05-18 PROCEDURE — 70450 CT HEAD/BRAIN W/O DYE: CPT

## 2020-05-18 PROCEDURE — 93306 TTE W/DOPPLER COMPLETE: CPT

## 2020-05-18 PROCEDURE — 93880 EXTRACRANIAL BILAT STUDY: CPT

## 2020-05-19 ENCOUNTER — APPOINTMENT (OUTPATIENT)
Dept: CT IMAGING | Facility: HOSPITAL | Age: 55
End: 2020-05-19

## 2020-05-19 ENCOUNTER — OFFICE VISIT (OUTPATIENT)
Dept: INTERNAL MEDICINE | Facility: CLINIC | Age: 55
End: 2020-05-19

## 2020-05-19 VITALS
TEMPERATURE: 98 F | OXYGEN SATURATION: 98 % | DIASTOLIC BLOOD PRESSURE: 86 MMHG | WEIGHT: 315 LBS | BODY MASS INDEX: 41.75 KG/M2 | SYSTOLIC BLOOD PRESSURE: 140 MMHG | HEIGHT: 73 IN | HEART RATE: 84 BPM

## 2020-05-19 DIAGNOSIS — G43.909 MIGRAINE WITHOUT STATUS MIGRAINOSUS, NOT INTRACTABLE, UNSPECIFIED MIGRAINE TYPE: ICD-10-CM

## 2020-05-19 DIAGNOSIS — R53.83 OTHER FATIGUE: ICD-10-CM

## 2020-05-19 DIAGNOSIS — E79.0 ELEVATED BLOOD URIC ACID LEVEL: ICD-10-CM

## 2020-05-19 DIAGNOSIS — I10 BENIGN ESSENTIAL HYPERTENSION: Primary | ICD-10-CM

## 2020-05-19 DIAGNOSIS — R55 NEAR SYNCOPE: ICD-10-CM

## 2020-05-19 DIAGNOSIS — N52.9 ERECTILE DYSFUNCTION, UNSPECIFIED ERECTILE DYSFUNCTION TYPE: ICD-10-CM

## 2020-05-19 DIAGNOSIS — G47.30 SLEEP APNEA, UNSPECIFIED TYPE: ICD-10-CM

## 2020-05-19 DIAGNOSIS — I25.10 CORONARY ARTERY DISEASE INVOLVING NATIVE CORONARY ARTERY WITHOUT ANGINA PECTORIS, UNSPECIFIED WHETHER NATIVE OR TRANSPLANTED HEART: ICD-10-CM

## 2020-05-19 DIAGNOSIS — E66.01 MORBID OBESITY (HCC): ICD-10-CM

## 2020-05-19 DIAGNOSIS — M17.0 OSTEOARTHRITIS OF BOTH KNEES, UNSPECIFIED OSTEOARTHRITIS TYPE: ICD-10-CM

## 2020-05-19 PROCEDURE — 93227 XTRNL ECG REC<48 HR R&I: CPT | Performed by: INTERNAL MEDICINE

## 2020-05-19 PROCEDURE — 99214 OFFICE O/P EST MOD 30 MIN: CPT | Performed by: INTERNAL MEDICINE

## 2020-05-19 NOTE — PROGRESS NOTES
Subjective   Willi Rayo is a 54 y.o. male.     Chief Complaint   Patient presents with   • Follow-up     1 week       History of Present Illness   Patient here for follow-up of.  No more near syncopal episode.  CT scan of the head normal echocardiogram unremarkable Holter monitor unremarkable carotid duplex unremarkable denies any low sugar episode no sleepiness at that time.  Blood pressure stable.  History of coronary artery disease without chest pain or short of breath.  Uric acid repeat normal.  Testosterone normal.  Sleep apnea stable on CPAP    Current Outpatient Medications:   •  allopurinol (ZYLOPRIM) 300 MG tablet, Take 1 tablet by mouth Daily., Disp: 90 tablet, Rfl: 1  •  atorvastatin (LIPITOR) 40 MG tablet, TAKE 1 TABLET BY MOUTH DAILY, Disp: 90 tablet, Rfl: 1  •  meloxicam (MOBIC) 15 MG tablet, Take 1 tablet by mouth Daily., Disp: 30 tablet, Rfl: 3  •  metoprolol succinate XL (TOPROL-XL) 50 MG 24 hr tablet, TAKE 1 TABLET BY MOUTH DAILY, Disp: 90 tablet, Rfl: 1  •  NITROSTAT 0.4 MG SL tablet, PLACE NOHELIA, Disp: , Rfl: 0  •  ondansetron (ZOFRAN) 4 MG tablet, Take 1 tablet by mouth Every 8 (Eight) Hours As Needed for Nausea or Vomiting., Disp: 21 tablet, Rfl: 0  •  polyethylene glycol (MIRALAX) powder powder, Clear liquid diet day prior to colonoscopy. Mix with 64oz of clear liquid. Drink 8oz every 10-15 min until consumed., Disp: 238 g, Rfl: 0  •  SUMAtriptan (IMITREX) 50 MG tablet, Take 1 tablet by mouth 1 (One) Time As Needed for Migraine for up to 1 dose., Disp: 15 tablet, Rfl: 1  •  Testosterone (AXIRON) 30 MG/ACT solution, 1 hattie each axilla daily, Disp: 90 mL, Rfl: 5  •  tolterodine LA (DETROL LA) 4 MG 24 hr capsule, TAKE 1 CAPSULE BY MOUTH DAILY, Disp: 90 capsule, Rfl: 1    The following portions of the patient's history were reviewed and updated as appropriate: allergies, current medications, past family history, past medical history, past social history, past surgical history and problem  list.    Review of Systems   Constitutional: Negative.    Respiratory: Negative.    Cardiovascular: Negative.    Gastrointestinal: Negative.    Musculoskeletal: Negative.    Skin: Negative.    Neurological: Negative.    Psychiatric/Behavioral: Negative.        Objective   Physical Exam   Constitutional: He is oriented to person, place, and time. He appears well-nourished.   Neck: Neck supple.   Cardiovascular: Normal rate, regular rhythm and normal heart sounds.   Pulmonary/Chest: Effort normal and breath sounds normal.   Abdominal: Bowel sounds are normal.   Neurological: He is alert and oriented to person, place, and time.   Skin: Skin is warm.   Psychiatric: He has a normal mood and affect.       All tests have been reviewed.    Assessment/Plan   Diagnoses and all orders for this visit:    Benign essential hypertension continue medication    Migraine without status migrainosus, not intractable, unspecified migraine type    Coronary artery disease involving native coronary artery without angina pectoris, unspecified whether native or transplanted heart patient is going to see cardiology for near syncope    Sleep apnea, unspecified type continue CPAP    Morbid obesity (CMS/HCC)    Osteoarthritis of both knees, unspecified osteoarthritis type    Elevated blood uric acid level repeat normal    Other fatigue improved    Erectile dysfunction, unspecified erectile dysfunction type normal testosterone    Near syncope work-up negative for cardiovascular disease, ?migraine  -     Ambulatory Referral to Neurology  Follow-up in 3 months per field [place] paperwork for patient and informing the risk of driving             Below is to historical records for reference only:   CAD soa with exertion: stress test abnormal and cotninue ASA 81mg ,normal Cath with mild plaque, continue lipitor do lab  HTN continue toprol   migraine stable now  plantar fasciitis continue mobic rest cold decompression  Morbid obesity good diet refer to  weight loss center  vitD low continue vitD3 1000iu daily  Colon 7/18/19 repeat 5 years. EGD 7/18/19:  Insomnia daytime sleepy, follow up with sleep study doctor  HLD diet continue medicinef  Prediabetes, 6.1  Knee oa follow up with ortho  Ed do lab  Fatigue, do lab  Urge incont, start detrol LA and do UA

## 2020-06-11 DIAGNOSIS — E79.0 ELEVATED BLOOD URIC ACID LEVEL: ICD-10-CM

## 2020-06-11 RX ORDER — ALLOPURINOL 300 MG/1
300 TABLET ORAL DAILY
Qty: 90 TABLET | Refills: 1 | Status: SHIPPED | OUTPATIENT
Start: 2020-06-11 | End: 2020-12-09

## 2020-06-17 ENCOUNTER — OFFICE VISIT (OUTPATIENT)
Dept: NEUROLOGY | Facility: CLINIC | Age: 55
End: 2020-06-17

## 2020-06-17 VITALS
SYSTOLIC BLOOD PRESSURE: 126 MMHG | HEIGHT: 73 IN | WEIGHT: 315 LBS | DIASTOLIC BLOOD PRESSURE: 74 MMHG | BODY MASS INDEX: 41.75 KG/M2 | HEART RATE: 83 BPM | OXYGEN SATURATION: 99 % | TEMPERATURE: 96.6 F

## 2020-06-17 DIAGNOSIS — R40.4 TRANSIENT ALTERATION OF AWARENESS: ICD-10-CM

## 2020-06-17 DIAGNOSIS — R55 NEAR SYNCOPE: Primary | ICD-10-CM

## 2020-06-17 PROCEDURE — 99214 OFFICE O/P EST MOD 30 MIN: CPT | Performed by: NURSE PRACTITIONER

## 2020-06-17 NOTE — PATIENT INSTRUCTIONS
Near-Syncope  Near-syncope is when you suddenly get weak or dizzy, or you feel like you might pass out (faint). This may also be called presyncope. This is due to a lack of blood flow to the brain. During an episode of near-syncope, you may:  · Feel dizzy, weak, or light-headed.  · Feel sick to your stomach (nauseous).  · See all white or all black.  · See spots.  · Have cold, clammy skin.  This condition is caused by a sudden decrease in blood flow to the brain. This decrease can result from various causes, but most of those causes are not dangerous. However, near-syncope may be a sign of a serious medical problem, so it is important to seek medical care.  Follow these instructions at home:  Medicines  · Take over-the-counter and prescription medicines only as told by your doctor.  · If you are taking blood pressure or heart medicine, get up slowly and spend many minutes getting ready to sit and then stand. This can help with dizziness.  General instructions  · Be aware of any changes in your symptoms.  · Talk with your doctor about your symptoms. You may need to have testing to find the cause of your near-syncope.  · If you start to feel like you might pass out, lie down right away. Raise (elevate) your feet above the level of your heart. Breathe deeply and steadily. Wait until all of the symptoms are gone.  · Have someone stay with you until you feel stable.  · Do not drive, use machinery, or play sports until your doctor says it is okay.  · Drink enough fluid to keep your pee (urine) pale yellow.  · Keep all follow-up visits as told by your doctor. This is important.  Get help right away if you:  · Have a seizure.  · Have pain in your:  ? Chest.  ? Belly (abdomen).  ? Back.  · Faint once or more than once.  · Have a very bad headache.  · Are bleeding from your mouth or butt.  · Have black or tarry poop (stool).  · Have a very fast or uneven heartbeat (palpitations).  · Are mixed up (confused).  · Have trouble  walking.  · Are very weak.  · Have trouble seeing.  These symptoms may be an emergency. Do not wait to see if the symptoms will go away. Get medical help right away. Call your local emergency services (911 in the [place] Do not drive yourself to the hospital.  Summary  · Near-syncope is when you suddenly get weak or dizzy, or you feel like you might pass out (faint).  · This condition is caused by a lack of blood flow to the brain.  · Near-syncope may be a sign of a serious medical problem, so it is important to seek medical care.  This information is not intended to replace advice given to you by your health care provider. Make sure you discuss any questions you have with your health care provider.  Document Released: 06/05/2009 Document Revised: 04/10/2020 Document Reviewed: 11/06/2019  [employer] Patient Education © 2020 [employer]    Bariatric Surgery Information  Bariatric surgery, also called weight loss surgery, is a procedure that helps you lose weight. You may consider, or your health care provider may suggest, bariatric surgery if:  · You are severely obese and have been unable to lose weight through diet and exercise.  · You have health problems related to obesity, such as:  ? Type 2 diabetes.  ? Heart disease.  ? Lung disease.  How does bariatric surgery help me lose weight?  Bariatric surgery helps you lose weight by:  · Decreasing how much food your body absorbs. This is done by closing off part of your stomach to make it smaller. This restricts the amount of food your stomach can hold.  · Changing your body's regular digestive process so that food bypasses the parts of your body that absorb calories and nutrients.  If you decide to have bariatric surgery, it is important to continue to eat a healthy diet and exercise regularly after the surgery.  What are the different kinds of bariatric surgery?  There are two kinds of bariatric surgeries:  · Restrictive surgery. This procedure makes your stomach  smaller. It does not change your digestive process. The smaller the size of your new stomach, the less food you can eat. There are different types of restrictive surgeries.  · Malabsorptive surgery. This procedure makes your stomach smaller and alters your digestive process so that your body processes less calories and nutrients. These are the most common kind of bariatric surgery. There are different types of malabsorptive surgeries.  What are the different types of restrictive surgery?  Adjustable Gastric Banding  In this procedure, an inflatable band is placed around your stomach near the upper end. This makes the passageway for food into the rest of your stomach much smaller. The band can be adjusted, making it tighter or looser, by filling it with salt solution. Your surgeon can adjust the band based on how you are feeling and how much weight you are losing. The band can be removed in the future. This requires another surgery.  Sleeve Gastrectomy  In this procedure, your stomach is made smaller. This is done by surgically removing a large part of your stomach. When your stomach is smaller, you feel full more quickly and reduce how much you eat.  What are the different types of malabsorptive surgery?    Emmanuelle-en-Y Gastric Bypass (RGB)  This is the most common weight loss surgery. In this procedure, a small stomach pouch (gastric pouch) is created in the upper part of your stomach. Next, this gastric pouch is attached directly to the middle part of your small intestine. The farther down your small intestine the new connection is made, the fewer calories and nutrients you will absorb. This surgery has the highest rate of complications.  Biliopancreatic Diversion with Duodenal Switch (BPD/DS)  This is a multi-step procedure. First, a large part of your stomach is removed, making your stomach smaller. Next, this smaller stomach is attached to the lower part of your small intestine. Like the RGB surgery, you absorb  fewer calories and nutrients the farther down your small intestine the attachment is made.  What are the risks of bariatric surgery?  As with any surgical procedure, each type of bariatric surgery has its own risks. These risks also depend on your age, your overall health, and any other medical conditions you may have. When deciding on bariatric surgery, it is very important to:  · Talk to your health care provider and choose the surgery that is best for you.  · Ask your health care provider about specific risks for the surgery you choose.  Generally, the risks of bariatric surgery include:  · Infection.  · Bleeding.  · Not getting enough nutrients from food (nutritional deficiencies).  · Failure of the device or procedure. This may require another surgery to correct the problem.  Where to find more information  · American Society for Metabolic & Bariatric Surgery: www.asmbs.org  · Weight-control Information Network (WIN): win.niddk.nih.gov  Summary  · Bariatric surgery, also called weight loss surgery, is a procedure that helps you lose weight.  · This surgery may be recommended if you have diabetes, heart disease, or lung disease.  · Generally, risks of bariatric surgery include infection, bleeding, and failure of the surgery or device, which may require another surgery to correct the problem.  This information is not intended to replace advice given to you by your health care provider. Make sure you discuss any questions you have with your health care provider.  Document Released: 12/18/2006 Document Revised: 04/07/2020 Document Reviewed: 01/22/2018  [employer] Patient Education © 2020 [employer]

## 2020-06-17 NOTE — PROGRESS NOTES
"     New Neurology Patient Office Visit      Patient Name: Willi Rayo    Referring Physician: Paul Verduzco MD    Chief Complaint:    Chief Complaint   Patient presents with   • Syncope     NP, he is here to establish care for near syncope.  Patient has symptoms started in May, 2020.       History of Present Illness: Willi Rayo is a very pleasant 54 y.o. male who is here today to establish care with Neurology.  He was referred by his PCP after experiencing a near syncopal event in May 2020.  He reports that he was driving when he suddenly felt \"everything closed in on me\".  He did not lose consciousness, but felt that his vision became tunnel like for about 30 seconds.  He was able to maintain control of his vehicle and this improved without any intervention.  Since this episode he has experienced a few more \"dizzy spells\" associated with some lightheadedness.  During one recent episode, he took his blood pressure with home wrist cuff and it was noted to be 170/100.  Patient is not diabetic per records.  He is compliant with current statin and beta-blocker therapy.  He has been evaluated extensively with head CT, carotid ultrasound, EKG, TTE, and placement of Holter monitor.  Cardiac work-up has been unremarkable. He denies any stroke symptoms during events, and has no focal neuro deficits.  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  He is able to work on his feet for 8 to 12-hour shifts without difficulty.  He has not had any spells of dizziness or lightheadedness while working.    The following portions of the patient's history were reviewed and updated as appropriate: allergies, current medications, past family history, past medical history, past social history, past surgical history and problem list.    Subjective      Review of " Systems:   Review of Systems   Constitutional: Negative for activity change and fatigue.   HENT: Negative for drooling and voice change.    Eyes: Negative for blurred vision, double vision, photophobia and visual disturbance.   Respiratory: Negative for shortness of breath.    Cardiovascular: Negative for chest pain and palpitations.   Gastrointestinal: Negative for nausea and vomiting.   Genitourinary: Negative for urinary incontinence.   Musculoskeletal: Negative for arthralgias, back pain, gait problem, myalgias and neck pain.   Allergic/Immunologic: Negative for immunocompromised state.   Neurological: Positive for dizziness, syncope, numbness and headache. Negative for tremors, seizures, facial asymmetry, speech difficulty, weakness, light-headedness, memory problem and confusion.   Hematological: Does not bruise/bleed easily.   Psychiatric/Behavioral: Negative for decreased concentration, dysphoric mood, hallucinations, sleep disturbance, depressed mood and stress. The patient is not nervous/anxious.        Past Medical History:   Past Medical History:   Diagnosis Date   • Abdominal bloating    • Abdominal pain    • Arthritis    • Bilateral knee pain    • Constipation    • Enlarged heart    • GERD (gastroesophageal reflux disease)    • Gout of big toe    • History of chest pain    • History of echocardiogram    • History of nuclear stress test    • Hyperlipidemia    • Hypertension    • Migraines    • Morbid obesity (CMS/HCC)    • RLS (restless legs syndrome)    • Sinusitis    • Sleep apnea     Non-compliant with pap   • Urinary urgency    • Wears glasses        Past Surgical History:   Past Surgical History:   Procedure Laterality Date   • CARDIAC CATHETERIZATION Right     No stents   • COLONOSCOPY  2009   • COLONOSCOPY N/A 7/18/2019    Procedure: COLONOSCOPY WITH HOT BIOPSY POLYPECTOMY, HOT SNARE POLYPECTOMY;  Surgeon: Sharon Ventura MD;  Location: [facility] ENDOSCOPY;  Service: Gastroenterology   • ENDOSCOPY  N/A 7/18/2019    Procedure: ESOPHAGOGASTRODUODENOSCOPY WITH BIOPSY;  Surgeon: Sahron Ventura MD;  Location: [facility] ENDOSCOPY;  Service: Gastroenterology   • MANDIBLE FRACTURE CLOSED REDUCTION     • UPPER GASTROINTESTINAL ENDOSCOPY  2009   • VASECTOMY         Family History:   Family History   Problem Relation Age of Onset   • Heart disease Mother    • Heart disease Father    • Hyperlipidemia Father    • Hypertension Father    • Cancer Maternal Grandmother    • Stroke Maternal Grandmother    • Colon cancer Neg Hx        Social History:   Social History     Socioeconomic History   • Marital status:      Spouse name: Not on file   • Number of children: Not on file   • Years of education: Not on file   • Highest education level: Not on file   Tobacco Use   • Smoking status: Never Smoker   • Smokeless tobacco: Never Used   Substance and Sexual Activity   • Alcohol use: No     Comment: social   • Drug use: No   • Sexual activity: Defer       Medications:     Current Outpatient Medications:   •  allopurinol (ZYLOPRIM) 300 MG tablet, TAKE 1 TABLET BY MOUTH DAILY, Disp: 90 tablet, Rfl: 1  •  atorvastatin (LIPITOR) 40 MG tablet, TAKE 1 TABLET BY MOUTH DAILY, Disp: 90 tablet, Rfl: 1  •  meloxicam (MOBIC) 15 MG tablet, Take 1 tablet by mouth Daily., Disp: 30 tablet, Rfl: 3  •  metoprolol succinate XL (TOPROL-XL) 50 MG 24 hr tablet, TAKE 1 TABLET BY MOUTH DAILY, Disp: 90 tablet, Rfl: 1  •  NITROSTAT 0.4 MG SL tablet, PLACE NOHELIA, Disp: , Rfl: 0  •  ondansetron (ZOFRAN) 4 MG tablet, Take 1 tablet by mouth Every 8 (Eight) Hours As Needed for Nausea or Vomiting., Disp: 21 tablet, Rfl: 0  •  polyethylene glycol (MIRALAX) powder powder, Clear liquid diet day prior to colonoscopy. Mix with 64oz of clear liquid. Drink 8oz every 10-15 min until consumed., Disp: 238 g, Rfl: 0  •  SUMAtriptan (IMITREX) 50 MG tablet, Take 1 tablet by mouth 1 (One) Time As Needed for Migraine for up to 1 dose., Disp: 15 tablet, Rfl: 1  •   "Testosterone (AXIRON) 30 MG/ACT solution, 1 hattie each axilla daily, Disp: 90 mL, Rfl: 5  •  tolterodine LA (DETROL LA) 4 MG 24 hr capsule, TAKE 1 CAPSULE BY MOUTH DAILY, Disp: 90 capsule, Rfl: 1    Allergies:   Allergies   Allergen Reactions   • Amoxicillin Shortness Of Breath   • Azithromycin Shortness Of Breath   • Cephalosporins Shortness Of Breath   • Penicillins Shortness Of Breath   • Prednisone Irritability       Objective     Physical Exam:  Vital Signs:   Vitals:    06/17/20 1406   BP: 126/74   BP Location: Left arm   Patient Position: Sitting   Cuff Size: Large Adult   Pulse: 83   Temp: 96.6 °F (35.9 °C)   SpO2: 99%   Weight: (!) 199 kg (438 lb)   Height: 185.2 cm (72.9\")   PainSc:   4   PainLoc: Knee       Physical Exam   Constitutional: He is oriented to person, place, and time. He is morbidly obese.  Eyes: Pupils are equal, round, and reactive to light. EOM are normal.   Cardiovascular: Regular rhythm and normal heart sounds.   Pulmonary/Chest: Effort normal and breath sounds normal.   Neurological: He is oriented to person, place, and time. He has normal strength. He has a normal Romberg Test and a normal Tandem Gait Test. Gait normal.   Reflex Scores:       Bicep reflexes are 1+ on the right side and 1+ on the left side.       Brachioradialis reflexes are 1+ on the right side and 1+ on the left side.       Patellar reflexes are 2+ on the right side and 2+ on the left side.       Achilles reflexes are 1+ on the right side and 1+ on the left side.  Skin: Skin is warm and dry. Capillary refill takes less than 2 seconds.   Psychiatric: He has a normal mood and affect. His speech is normal and behavior is normal. Judgment and thought content normal.   Nursing note and vitals reviewed.      Neurologic Exam     Mental Status   Oriented to person, place, and time.   Attention: normal. Concentration: normal.   Speech: speech is normal   Level of consciousness: alert  Knowledge: good.   Normal comprehension. "     Cranial Nerves     CN II   Visual fields full to confrontation.   Visual acuity: normal    CN III, IV, VI   Pupils are equal, round, and reactive to light.  Extraocular motions are normal.   Right pupil: Shape: regular. Reactivity: brisk.   Left pupil: Shape: regular. Reactivity: brisk.   Diplopia: none  Ophthalmoparesis: none  Upgaze: normal  Downgaze: normal    CN V   Facial sensation intact.     CN VII   Facial expression full, symmetric.     CN VIII   CN VIII normal.     CN IX, X   CN IX normal.   CN X normal.     CN XI   CN XI normal.     CN XII   CN XII normal.     Motor Exam   Muscle bulk: normal  Overall muscle tone: normal  Right arm pronator drift: absent  Left arm pronator drift: absent    Strength   Strength 5/5 throughout.     Sensory Exam   Light touch normal.     Gait, Coordination, and Reflexes     Gait  Gait: normal    Coordination   Romberg: negative  Tandem walking coordination: normal    Tremor   Resting tremor: absent  Intention tremor: absent    Reflexes   Reflexes 2+ except as noted.   Right brachioradialis: 1+  Left brachioradialis: 1+  Right biceps: 1+  Left biceps: 1+  Right patellar: 2+  Left patellar: 2+  Right achilles: 1+  Left achilles: 1+  Right plantar: normal  Left plantar: normal  Right Alvarez: absent  Left Alvarez: absent       Results Review:   I reviewed the patient's new clinical results.  I have reviewed the patient's other medical records to include, labs, radiology and referrals.   I reviewed the patient's new imaging results and agree with the interpretation.  I reviewed the patient's other test results and agree with the interpretation    Assessment / Plan      Assessment/Plan:   Willi was seen today for syncope.    Diagnoses and all orders for this visit:    Near syncope  -     MRI Brain Without Contrast; Future  Patient has had some recent episodes of syncope, negative cardiac work-up.  We discussed that patient does have several vascular risk factors for stroke,  including hypertension, obesity, and high cholesterol.  He currently has no focal neuro deficits and denies the abnormal limb movements or weakness during these episodes.  These typically resolve quickly without intervention.  He has been ordered for brain MRI to assess for vascular or structural abnormality.  He was counseled to continue checking blood pressure at home, and maintain compliance with current medications.  He has been advised that weight loss will likely improve his overall health, and he acknowledged this and reports that he is considering surgical intervention for weight loss.    Transient alteration of awareness  -     EEG Awake or Drowsy Routine; Future  Patient has had some recent episodes of transient alterations of awareness, he has been ordered for EEG to assess for epileptiform activity.  Patient was advised not to drive for at least 90 days following most recent episode.  He is aware of this driving restriction.         Follow Up:   Return in about 6 weeks (around 7/29/2020).       Lissette Rico, APRSHAMA  [facility]       Please note that portions of this note may have been completed with a voice recognition program. Efforts were made to edit the dictations, but occasionally words are mistranscribed.

## 2020-06-19 DIAGNOSIS — N39.41 URGE INCONTINENCE: ICD-10-CM

## 2020-06-19 RX ORDER — TOLTERODINE 4 MG/1
4 CAPSULE, EXTENDED RELEASE ORAL DAILY
Qty: 90 CAPSULE | Refills: 1 | Status: SHIPPED | OUTPATIENT
Start: 2020-06-19 | End: 2020-12-14

## 2020-07-14 ENCOUNTER — HOSPITAL ENCOUNTER (OUTPATIENT)
Dept: MRI IMAGING | Facility: HOSPITAL | Age: 55
Discharge: HOME | End: 2020-07-14
Admitting: NURSE PRACTITIONER

## 2020-07-14 ENCOUNTER — HOSPITAL ENCOUNTER (OUTPATIENT)
Dept: SLEEP MEDICINE | Facility: HOSPITAL | Age: 55
End: 2020-07-14

## 2020-07-14 ENCOUNTER — TELEPHONE (OUTPATIENT)
Dept: NEUROLOGY | Facility: CLINIC | Age: 55
End: 2020-07-14

## 2020-07-14 DIAGNOSIS — R55 NEAR SYNCOPE: ICD-10-CM

## 2020-07-14 DIAGNOSIS — R55 NEAR SYNCOPE: Primary | ICD-10-CM

## 2020-07-14 DIAGNOSIS — R40.4 TRANSIENT ALTERATION OF AWARENESS: ICD-10-CM

## 2020-07-14 PROCEDURE — 95816 EEG AWAKE AND DROWSY: CPT

## 2020-07-15 ENCOUNTER — OFFICE VISIT (OUTPATIENT)
Dept: PULMONOLOGY | Facility: CLINIC | Age: 55
End: 2020-07-15

## 2020-07-15 VITALS
TEMPERATURE: 97.7 F | HEIGHT: 73 IN | OXYGEN SATURATION: 96 % | RESPIRATION RATE: 18 BRPM | HEART RATE: 87 BPM | BODY MASS INDEX: 41.75 KG/M2 | WEIGHT: 315 LBS | DIASTOLIC BLOOD PRESSURE: 74 MMHG | SYSTOLIC BLOOD PRESSURE: 122 MMHG

## 2020-07-15 DIAGNOSIS — G47.33 OSA (OBSTRUCTIVE SLEEP APNEA): Primary | ICD-10-CM

## 2020-07-15 DIAGNOSIS — E66.01 MORBID OBESITY, UNSPECIFIED OBESITY TYPE (HCC): ICD-10-CM

## 2020-07-15 PROCEDURE — 99213 OFFICE O/P EST LOW 20 MIN: CPT | Performed by: INTERNAL MEDICINE

## 2020-07-15 RX ORDER — OMEPRAZOLE 20 MG/1
20 CAPSULE, DELAYED RELEASE ORAL DAILY
COMMUNITY
End: 2021-06-03

## 2020-07-15 RX ORDER — ASPIRIN 81 MG/1
81 TABLET, CHEWABLE ORAL DAILY
COMMUNITY

## 2020-07-15 NOTE — PROGRESS NOTES
"Chief Complaint   Patient presents with   • Follow-up   • Sleeping Problem       Subjective   Willi Rayo is a 54 y.o. male.     History of Present Illness   Patient was evaluated today for follow up of Sleep apnea.     Patient doesn't report any issues with the device. The patient describes no significant issues with his mask either. Patient says that the compliance with the use of the equipment is good.     Patient says that his symptoms of fatigue & daytime sleepiness have been helped greatly with the use of PAP, as prescribed.       The following portions of the patient's history were reviewed and updated as appropriate: allergies, current medications, past family history, past medical history, past social history and past surgical history.    Review of Systems   HENT: Negative for rhinorrhea, sinus pressure, sinus pain, sore throat, trouble swallowing and voice change.    Respiratory: Positive for shortness of breath. Negative for cough, chest tightness and wheezing.        Objective   Visit Vitals  /74   Pulse 87   Temp 97.7 °F (36.5 °C)   Resp 18   Ht 185.2 cm (72.91\")   Wt (!) 199 kg (438 lb)   SpO2 96%   BMI 57.93 kg/m²       Physical Exam   Constitutional: He is oriented to person, place, and time. He appears well-developed and well-nourished.   HENT:   Head: Atraumatic.   Crowded oropharynx.    Neck:   Increased adipose tissue.    Musculoskeletal:   Gait was normal.   Neurological: He is alert and oriented to person, place, and time.   Psychiatric: He has a normal mood and affect.   Vitals reviewed.        Assessment/Plan   Willi was seen today for follow-up and sleeping problem.    Diagnoses and all orders for this visit:    VALERIE (obstructive sleep apnea)  -     BIPAP / CPAP Adjustment    Morbid obesity, unspecified obesity type (CMS/HCC)  -     BIPAP / CPAP Adjustment         Return in about 10 months (around 5/15/2021) for Dominic/Lissette.    DISCUSSION (if any):  I reviewed the results of " last sleep study in detail. I informed him that the apnea hypopnea index was 99 / hr. This was a home study. This was done in Nov 2019.    Continue treatment with AutoPAP at a pressure of 8/16, with a full-face mask.    Patient seems to be compliant with PAP device, based on the available data and his account of improved symptoms.     Weight loss advised.    He is being considered for bariatric surgery.    The patient was once again reminded to continue using the PAP device regularly, every night for atleast 4 hours.    I spent a total of 20 minutes face to face with this patient. his  pertinent current and previous data, as applicable, were reviewed. Patient's diagnostic studies were also reviewed. More than 10 minutes of the time spent, was spent in counseling about patient's underlying disease process, reviewing any available sleep studies, need to use devices (as applicable) on a regular basis and lifestyle modifications that may positively impact patient's health.       Dictated utilizing Dragon dictation.    This document was electronically signed by Celeste Main MD on 07/15/20 at 15:31

## 2020-07-30 ENCOUNTER — OFFICE VISIT (OUTPATIENT)
Dept: NEUROLOGY | Facility: CLINIC | Age: 55
End: 2020-07-30

## 2020-07-30 VITALS
OXYGEN SATURATION: 98 % | HEART RATE: 78 BPM | HEIGHT: 73 IN | BODY MASS INDEX: 41.75 KG/M2 | DIASTOLIC BLOOD PRESSURE: 80 MMHG | SYSTOLIC BLOOD PRESSURE: 132 MMHG | TEMPERATURE: 97.7 F | WEIGHT: 315 LBS

## 2020-07-30 DIAGNOSIS — R55 NEAR SYNCOPE: Primary | ICD-10-CM

## 2020-07-30 PROCEDURE — 99213 OFFICE O/P EST LOW 20 MIN: CPT | Performed by: NURSE PRACTITIONER

## 2020-08-10 DIAGNOSIS — I10 BENIGN ESSENTIAL HYPERTENSION: ICD-10-CM

## 2020-08-10 RX ORDER — METOPROLOL SUCCINATE 50 MG/1
50 TABLET, EXTENDED RELEASE ORAL DAILY
Qty: 90 TABLET | Refills: 1 | Status: SHIPPED | OUTPATIENT
Start: 2020-08-10 | End: 2021-02-01

## 2020-08-10 NOTE — TELEPHONE ENCOUNTER
Caller: GirmaWilli    Relationship: Self    Best call back number: [phone]  Medication needed:   Requested Prescriptions     Pending Prescriptions Disp Refills   • metoprolol succinate XL (TOPROL-XL) 50 MG 24 hr tablet 90 tablet 1     Sig: Take 1 tablet by mouth Daily.       When do you need the refill by: 08/10/2020  What details did the patient provide when requesting the medication: OUT OF MEDICATION  Does the patient have less than a 3 day supply:  [x] Yes  [] No    What is the patient's preferred pharmacy: [id number] [place] [address] [address] AT [facility] - [phone]  - [phone] FX

## 2020-08-19 ENCOUNTER — OFFICE VISIT (OUTPATIENT)
Dept: INTERNAL MEDICINE | Facility: CLINIC | Age: 55
End: 2020-08-19

## 2020-08-19 VITALS
BODY MASS INDEX: 41.75 KG/M2 | WEIGHT: 315 LBS | SYSTOLIC BLOOD PRESSURE: 124 MMHG | RESPIRATION RATE: 16 BRPM | OXYGEN SATURATION: 97 % | TEMPERATURE: 97.7 F | HEIGHT: 73 IN | HEART RATE: 77 BPM | DIASTOLIC BLOOD PRESSURE: 84 MMHG

## 2020-08-19 DIAGNOSIS — G43.909 MIGRAINE WITHOUT STATUS MIGRAINOSUS, NOT INTRACTABLE, UNSPECIFIED MIGRAINE TYPE: ICD-10-CM

## 2020-08-19 DIAGNOSIS — R55 NEAR SYNCOPE: Primary | ICD-10-CM

## 2020-08-19 DIAGNOSIS — E66.01 MORBID OBESITY (HCC): ICD-10-CM

## 2020-08-19 DIAGNOSIS — I10 BENIGN ESSENTIAL HYPERTENSION: ICD-10-CM

## 2020-08-19 DIAGNOSIS — G47.30 SLEEP APNEA, UNSPECIFIED TYPE: ICD-10-CM

## 2020-08-19 DIAGNOSIS — I25.10 CORONARY ARTERY DISEASE INVOLVING NATIVE CORONARY ARTERY WITHOUT ANGINA PECTORIS, UNSPECIFIED WHETHER NATIVE OR TRANSPLANTED HEART: ICD-10-CM

## 2020-08-19 PROBLEM — R53.83 OTHER FATIGUE: Status: RESOLVED | Noted: 2019-10-24 | Resolved: 2020-08-19

## 2020-08-19 PROCEDURE — 99214 OFFICE O/P EST MOD 30 MIN: CPT | Performed by: INTERNAL MEDICINE

## 2020-08-19 NOTE — PROGRESS NOTES
Subjective   Willi Rayo is a 55 y.o. male.     Chief Complaint   Patient presents with   • Hypertension     3 mo f/u        History of Present Illness   Patient here for follow-up.  Syncopal episode no more.  Patient was seen by pulmonologist to sleep apnea on CPAP machine now.  And neurologist work-up showed no seizure activities.  Patient here to do MRI of the brain.  Cardiologist work-up patient yet to do event monitor.  No chest pain no short of breath no palpitation or irregular heartbeats.  Weight problem patient is looking into bariatric surgery.  Blood pressure stable now on medication.    Current Outpatient Medications:   •  allopurinol (ZYLOPRIM) 300 MG tablet, TAKE 1 TABLET BY MOUTH DAILY, Disp: 90 tablet, Rfl: 1  •  aspirin 81 MG chewable tablet, Chew 81 mg Daily., Disp: , Rfl:   •  atorvastatin (LIPITOR) 40 MG tablet, TAKE 1 TABLET BY MOUTH DAILY, Disp: 90 tablet, Rfl: 1  •  meloxicam (MOBIC) 15 MG tablet, Take 1 tablet by mouth Daily., Disp: 30 tablet, Rfl: 3  •  metoprolol succinate XL (TOPROL-XL) 50 MG 24 hr tablet, Take 1 tablet by mouth Daily., Disp: 90 tablet, Rfl: 1  •  NITROSTAT 0.4 MG SL tablet, PLACE NOHELIA, Disp: , Rfl: 0  •  omeprazole (priLOSEC) 20 MG capsule, Take 20 mg by mouth Daily., Disp: , Rfl:   •  ondansetron (ZOFRAN) 4 MG tablet, Take 1 tablet by mouth Every 8 (Eight) Hours As Needed for Nausea or Vomiting., Disp: 21 tablet, Rfl: 0  •  polyethylene glycol (MIRALAX) powder powder, Clear liquid diet day prior to colonoscopy. Mix with 64oz of clear liquid. Drink 8oz every 10-15 min until consumed., Disp: 238 g, Rfl: 0  •  SUMAtriptan (IMITREX) 50 MG tablet, Take 1 tablet by mouth 1 (One) Time As Needed for Migraine for up to 1 dose., Disp: 15 tablet, Rfl: 1  •  Testosterone (AXIRON) 30 MG/ACT solution, 1 hattie each axilla daily, Disp: 90 mL, Rfl: 5  •  tolterodine LA (DETROL LA) 4 MG 24 hr capsule, TAKE 1 CAPSULE BY MOUTH DAILY, Disp: 90 capsule, Rfl: 1    The following portions of the  patient's history were reviewed and updated as appropriate: allergies, current medications, past family history, past medical history, past social history, past surgical history and problem list.    Review of Systems   Constitutional: Negative.    Respiratory: Negative.    Cardiovascular:        Syncope   Gastrointestinal: Negative.    Musculoskeletal: Negative.    Skin: Negative.    Neurological: Negative.    Psychiatric/Behavioral: Negative.        Objective   Physical Exam   Constitutional: He is oriented to person, place, and time. He appears well-developed and well-nourished.   Neck: Neck supple.   Cardiovascular: Normal rate, regular rhythm and normal heart sounds.   Pulmonary/Chest: Effort normal and breath sounds normal.   Abdominal: Bowel sounds are normal.   Neurological: He is alert and oriented to person, place, and time.   Skin: Skin is warm.   Psychiatric: He has a normal mood and affect.       All tests have been reviewed.    Assessment/Plan   Diagnoses and all orders for this visit:    Near syncope  -     Cardiac event monitor; Future, pending MRI head    Coronary artery disease involving native coronary artery without angina pectoris, unspecified whether native or transplanted heart stable    Morbid obesity (CMS/[facility])   -     Ambulatory Referral to Bariatric Surgery    Benign essential hypertension cont med    Migraine without status migrainosus, not intractable, unspecified migraine type no more    Sleep apnea, unspecified type cont cpap    6 weeks after event monitor             Below is to historical records for reference only:   CAD soa with exertion: stress test abnormal and cotninue ASA 81mg ,normal Cath with mild plaque, continue lipitor do lab  HTN continue toprol   migraine stable now  plantar fasciitis continue mobic rest cold decompression  Morbid obesity good diet refer to weight loss center  vitD low continue vitD3 1000iu daily  Colon 7/18/19 repeat 5 years. EGD 7/18/19:  Insomnia  daytime sleepy, follow up with sleep study doctor  HLD diet continue medicinef  Prediabetes, 6.1  Knee oa follow up with ortho  Ed do lab  Fatigue, do lab  Urge incont, start detrol LA and do UA

## 2020-08-22 ENCOUNTER — HOSPITAL ENCOUNTER (OUTPATIENT)
Dept: MRI IMAGING | Facility: HOSPITAL | Age: 55
Discharge: HOME | End: 2020-08-22
Admitting: NURSE PRACTITIONER

## 2020-08-22 ENCOUNTER — HOSPITAL ENCOUNTER (OUTPATIENT)
Dept: MRI IMAGING | Facility: HOSPITAL | Age: 55
Discharge: HOME | End: 2020-08-22

## 2020-08-22 DIAGNOSIS — R55 NEAR SYNCOPE: ICD-10-CM

## 2020-08-22 PROCEDURE — 70544 MR ANGIOGRAPHY HEAD W/O DYE: CPT

## 2020-08-22 PROCEDURE — 70551 MRI BRAIN STEM W/O DYE: CPT

## 2020-09-16 DIAGNOSIS — M17.0 OSTEOARTHRITIS OF BOTH KNEES, UNSPECIFIED OSTEOARTHRITIS TYPE: ICD-10-CM

## 2020-09-16 RX ORDER — MELOXICAM 15 MG/1
15 TABLET ORAL DAILY
Qty: 30 TABLET | Refills: 5 | Status: SHIPPED | OUTPATIENT
Start: 2020-09-16 | End: 2020-12-30

## 2020-09-21 DIAGNOSIS — R55 NEAR SYNCOPE: ICD-10-CM

## 2020-10-25 DIAGNOSIS — I25.10 CORONARY ARTERY DISEASE INVOLVING NATIVE CORONARY ARTERY WITHOUT ANGINA PECTORIS, UNSPECIFIED WHETHER NATIVE OR TRANSPLANTED HEART: ICD-10-CM

## 2020-10-26 RX ORDER — ATORVASTATIN CALCIUM 40 MG/1
40 TABLET, FILM COATED ORAL DAILY
Qty: 90 TABLET | Refills: 1 | Status: SHIPPED | OUTPATIENT
Start: 2020-10-26 | End: 2021-04-28

## 2020-12-09 DIAGNOSIS — E79.0 ELEVATED BLOOD URIC ACID LEVEL: ICD-10-CM

## 2020-12-09 RX ORDER — ALLOPURINOL 300 MG/1
300 TABLET ORAL DAILY
Qty: 90 TABLET | Refills: 3 | Status: SHIPPED | OUTPATIENT
Start: 2020-12-09 | End: 2021-12-20

## 2020-12-14 DIAGNOSIS — N39.41 URGE INCONTINENCE: ICD-10-CM

## 2020-12-14 RX ORDER — TOLTERODINE 4 MG/1
4 CAPSULE, EXTENDED RELEASE ORAL DAILY
Qty: 90 CAPSULE | Refills: 3 | Status: SHIPPED | OUTPATIENT
Start: 2020-12-14 | End: 2022-01-11 | Stop reason: SDUPTHER

## 2020-12-30 DIAGNOSIS — Z20.822 CLOSE EXPOSURE TO COVID-19 VIRUS: Primary | ICD-10-CM

## 2020-12-30 RX ORDER — IBUPROFEN 600 MG/1
600 TABLET ORAL 2 TIMES DAILY PRN
Qty: 60 TABLET | Refills: 1 | Status: SHIPPED | OUTPATIENT
Start: 2020-12-30 | End: 2021-03-01

## 2021-01-04 ENCOUNTER — LAB (OUTPATIENT)
Dept: LAB | Facility: HOSPITAL | Age: 56
End: 2021-01-04

## 2021-01-04 PROCEDURE — U0004 COV-19 TEST NON-CDC HGH THRU: HCPCS | Performed by: INTERNAL MEDICINE

## 2021-01-04 PROCEDURE — C9803 HOPD COVID-19 SPEC COLLECT: HCPCS

## 2021-01-06 ENCOUNTER — TELEMEDICINE (OUTPATIENT)
Dept: INTERNAL MEDICINE | Facility: CLINIC | Age: 56
End: 2021-01-06

## 2021-01-06 DIAGNOSIS — U07.1 COVID-19 VIRUS INFECTION: Primary | ICD-10-CM

## 2021-01-06 PROCEDURE — 99213 OFFICE O/P EST LOW 20 MIN: CPT | Performed by: INTERNAL MEDICINE

## 2021-01-06 NOTE — PROGRESS NOTES
Subjective   Willi Rayo is a 55 y.o. male.     Chief Complaint   Patient presents with   • Covid-19 Home Monitoring Video Visit       History of Present Illness   covid positive, cough, body ache, sputum, chill, stuffy nose, fatigue, no smell or taste change. Diarrhea , appetitie low, no n/v, sinus congestion, no sore throat. soa mild with exertion. otc med no help    Current Outpatient Medications:   •  allopurinol (ZYLOPRIM) 300 MG tablet, TAKE 1 TABLET BY MOUTH DAILY, Disp: 90 tablet, Rfl: 3  •  aspirin 81 MG chewable tablet, Chew 81 mg Daily., Disp: , Rfl:   •  atorvastatin (LIPITOR) 40 MG tablet, TAKE 1 TABLET BY MOUTH DAILY, Disp: 90 tablet, Rfl: 1  •  ibuprofen (ADVIL,MOTRIN) 600 MG tablet, Take 1 tablet by mouth 2 (Two) Times a Day As Needed for Mild Pain ., Disp: 60 tablet, Rfl: 1  •  metoprolol succinate XL (TOPROL-XL) 50 MG 24 hr tablet, Take 1 tablet by mouth Daily., Disp: 90 tablet, Rfl: 1  •  NITROSTAT 0.4 MG SL tablet, PLACE NOHELIA, Disp: , Rfl: 0  •  omeprazole (priLOSEC) 20 MG capsule, Take 20 mg by mouth Daily., Disp: , Rfl:   •  ondansetron (ZOFRAN) 4 MG tablet, Take 1 tablet by mouth Every 8 (Eight) Hours As Needed for Nausea or Vomiting., Disp: 21 tablet, Rfl: 0  •  polyethylene glycol (MIRALAX) powder powder, Clear liquid diet day prior to colonoscopy. Mix with 64oz of clear liquid. Drink 8oz every 10-15 min until consumed., Disp: 238 g, Rfl: 0  •  SUMAtriptan (IMITREX) 50 MG tablet, Take 1 tablet by mouth 1 (One) Time As Needed for Migraine for up to 1 dose., Disp: 15 tablet, Rfl: 1  •  Testosterone (AXIRON) 30 MG/ACT solution, 1 hattie each axilla daily, Disp: 90 mL, Rfl: 5  •  tolterodine LA (DETROL LA) 4 MG 24 hr capsule, TAKE 1 CAPSULE BY MOUTH DAILY, Disp: 90 capsule, Rfl: 3    The following portions of the patient's history were reviewed and updated as appropriate: allergies, current medications, past family history, past medical history, past social history, past surgical history and  problem list.    Review of Systems   Constitutional: Positive for chills and fatigue. Negative for fever.   HENT: Positive for congestion, sinus pressure and sore throat.    Respiratory: Negative.    Cardiovascular: Negative.    Gastrointestinal: Negative.    Musculoskeletal: Positive for myalgias.   Skin: Negative.    Neurological: Negative.    Psychiatric/Behavioral: Negative.        Objective   Physical Exam  Constitutional:       Appearance: Normal appearance. He is obese.   Pulmonary:      Effort: Pulmonary effort is normal.   Neurological:      Mental Status: He is alert. Mental status is at baseline.         All tests have been reviewed.    Assessment/Plan   Diagnoses and all orders for this visit:    COVID-19 virus infection    mucinex and zyrtec and tylenol   Rest water     Follow up if worse.          You have chosen to receive care through a telehealth visit.  Do you consent to use a video/audio connection for your medical care today? Yes

## 2021-01-11 ENCOUNTER — TELEPHONE (OUTPATIENT)
Dept: INTERNAL MEDICINE | Facility: CLINIC | Age: 56
End: 2021-01-11

## 2021-01-11 NOTE — TELEPHONE ENCOUNTER
April @ [facility] called to request patient's demographics. P # [phone] F# [phone].     Faxed demo sheet as requested.

## 2021-01-31 DIAGNOSIS — I10 BENIGN ESSENTIAL HYPERTENSION: ICD-10-CM

## 2021-02-01 RX ORDER — METOPROLOL SUCCINATE 50 MG/1
50 TABLET, EXTENDED RELEASE ORAL DAILY
Qty: 90 TABLET | Refills: 3 | Status: SHIPPED | OUTPATIENT
Start: 2021-02-01 | End: 2021-10-19

## 2021-03-01 RX ORDER — IBUPROFEN 600 MG/1
TABLET ORAL
Qty: 60 TABLET | Refills: 1 | Status: SHIPPED | OUTPATIENT
Start: 2021-03-01 | End: 2021-07-19 | Stop reason: SDUPTHER

## 2021-04-28 DIAGNOSIS — I25.10 CORONARY ARTERY DISEASE INVOLVING NATIVE CORONARY ARTERY WITHOUT ANGINA PECTORIS, UNSPECIFIED WHETHER NATIVE OR TRANSPLANTED HEART: ICD-10-CM

## 2021-04-28 RX ORDER — ATORVASTATIN CALCIUM 40 MG/1
40 TABLET, FILM COATED ORAL DAILY
Qty: 90 TABLET | Refills: 3 | Status: SHIPPED | OUTPATIENT
Start: 2021-04-28 | End: 2022-01-11 | Stop reason: SDUPTHER

## 2021-06-03 ENCOUNTER — OFFICE VISIT (OUTPATIENT)
Dept: INTERNAL MEDICINE | Facility: CLINIC | Age: 56
End: 2021-06-03

## 2021-06-03 VITALS
OXYGEN SATURATION: 97 % | WEIGHT: 315 LBS | HEIGHT: 73 IN | TEMPERATURE: 96.2 F | BODY MASS INDEX: 41.75 KG/M2 | SYSTOLIC BLOOD PRESSURE: 132 MMHG | HEART RATE: 110 BPM | DIASTOLIC BLOOD PRESSURE: 82 MMHG

## 2021-06-03 DIAGNOSIS — L91.8 SKIN TAGS, MULTIPLE ACQUIRED: ICD-10-CM

## 2021-06-03 DIAGNOSIS — N52.9 ERECTILE DYSFUNCTION, UNSPECIFIED ERECTILE DYSFUNCTION TYPE: ICD-10-CM

## 2021-06-03 DIAGNOSIS — I25.10 CORONARY ARTERY DISEASE INVOLVING NATIVE CORONARY ARTERY WITHOUT ANGINA PECTORIS, UNSPECIFIED WHETHER NATIVE OR TRANSPLANTED HEART: Primary | ICD-10-CM

## 2021-06-03 DIAGNOSIS — I10 BENIGN ESSENTIAL HYPERTENSION: ICD-10-CM

## 2021-06-03 DIAGNOSIS — E79.0 ELEVATED BLOOD URIC ACID LEVEL: ICD-10-CM

## 2021-06-03 DIAGNOSIS — G43.909 MIGRAINE WITHOUT STATUS MIGRAINOSUS, NOT INTRACTABLE, UNSPECIFIED MIGRAINE TYPE: ICD-10-CM

## 2021-06-03 DIAGNOSIS — R55 NEAR SYNCOPE: ICD-10-CM

## 2021-06-03 DIAGNOSIS — E66.01 MORBID OBESITY (HCC): ICD-10-CM

## 2021-06-03 DIAGNOSIS — M17.0 OSTEOARTHRITIS OF BOTH KNEES, UNSPECIFIED OSTEOARTHRITIS TYPE: ICD-10-CM

## 2021-06-03 DIAGNOSIS — G47.30 SLEEP APNEA, UNSPECIFIED TYPE: ICD-10-CM

## 2021-06-03 DIAGNOSIS — Z00.00 ANNUAL PHYSICAL EXAM: ICD-10-CM

## 2021-06-03 PROBLEM — U07.1 COVID-19 VIRUS INFECTION: Status: RESOLVED | Noted: 2021-01-06 | Resolved: 2021-06-03

## 2021-06-03 PROCEDURE — 99396 PREV VISIT EST AGE 40-64: CPT | Performed by: INTERNAL MEDICINE

## 2021-06-03 PROCEDURE — 99214 OFFICE O/P EST MOD 30 MIN: CPT | Performed by: INTERNAL MEDICINE

## 2021-06-03 RX ORDER — MELOXICAM 15 MG/1
15 TABLET ORAL DAILY
COMMUNITY
End: 2022-08-04

## 2021-06-03 NOTE — PROGRESS NOTES
Subjective   Willi Rayo is a 55 y.o. male and is here for a comprehensive physical exam.     Patient here for follow-up of.  Also has a physical.  Recently near syncope episode again after exertion.  Patient denies loss of consciousness.  Denies any palpitation chest pain no nausea no seizure activity.  History of a similar episode event recorder unremarkable.  Patient is a also a morbid obese.  History of a coronary artery disease.  BPH is stable on medication.  Migraine stable.  Sleep apnea on CPAP is stable.    Do you take any herbs or supplements that were not prescribed by a doctor? no  Are you taking calcium supplements? no  Are you taking aspirin daily? no      The following portions of the patient's history were reviewed and updated as appropriate: allergies, current medications, past family history, past medical history, past social history, past surgical history and problem list.      Review of Systems   Constitutional: Negative.    HENT: Negative.    Eyes: Negative.    Respiratory: Negative.    Cardiovascular: Negative.    Gastrointestinal: Negative.    Endocrine: Negative.    Genitourinary: Negative.    Musculoskeletal: Negative.    Skin: Negative.    Allergic/Immunologic: Negative.    Neurological: Negative.    Hematological: Negative.    Psychiatric/Behavioral: Negative.    All other systems reviewed and are negative.        Physical Exam  Constitutional:       Appearance: Normal appearance. He is well-developed. He is obese.   HENT:      Head: Normocephalic.      Right Ear: Tympanic membrane, ear canal and external ear normal.      Left Ear: Tympanic membrane, ear canal and external ear normal.      Nose: Nose normal.      Mouth/Throat:      Mouth: Mucous membranes are moist.      Pharynx: Oropharynx is clear.   Eyes:      Conjunctiva/sclera: Conjunctivae normal.      Pupils: Pupils are equal, round, and reactive to light.   Cardiovascular:      Rate and Rhythm: Normal rate and regular rhythm.       Heart sounds: Normal heart sounds.   Pulmonary:      Effort: Pulmonary effort is normal.      Breath sounds: Normal breath sounds.   Abdominal:      General: Bowel sounds are normal.      Palpations: Abdomen is soft.   Musculoskeletal:      Cervical back: Normal range of motion and neck supple.   Skin:     General: Skin is warm.   Neurological:      Mental Status: He is alert and oriented to person, place, and time.   Psychiatric:         Mood and Affect: Mood normal.         Behavior: Behavior normal.         Thought Content: Thought content normal.         Judgment: Judgment normal.         All  tests have been reviewed.    Assessment/Plan          1. Patient Counseling:  --Nutrition: Stressed importance of moderation in sodium/caffeine intake, saturated fat and cholesterol, caloric balance, sufficient intake of fresh fruits, vegetables, fiber, calcium and iron.  --Exercise: Stressed the importance of regular exercise.   --Injury prevention: Discussed safety belts, safety helmets, smoke detector, smoking near bedding or upholstery.   --Dental health: Discussed importance of regular tooth brushing, flossing, and dental visits.  --Immunizations reviewed.  --Discussed benefits of screening colonoscopy.  --After hours service discussed with patient    2. Discussed the patient's BMI with him.               Near syncope echo and event monitor and carotid duplex unremarkable, perform stress test    MORROW do stress test      Coronary artery disease involving native coronary artery without angina pectoris, unspecified whether native or transplanted heart stable stress test     Morbid obesity pending Bariatric Surgery     Benign essential hypertension cont med     Migraine without status migrainosus, not intractable, unspecified migraine type no more     Sleep apnea,  cont cpap     6 weeks after event monitor      6 week            Below is to historical records for reference only:   CAD soa with exertion: stress test  abnormal and cotninue ASA 81mg ,normal Cath with mild plaque, continue lipitor do lab  HTN continue toprol   migraine stable now  plantar fasciitis continue mobic rest cold decompression  Morbid obesity good diet refer to weight loss center  vitD low continue vitD3 1000iu daily  Colon 7/18/19 repeat 5 years. EGD 7/18/19:  Insomnia daytime sleepy, follow up with sleep study doctor  HLD diet continue medicinef  Prediabetes, 6.1  Knee oa follow up with ortho  Ed do lab  Fatigue, do lab  Urge incont, cont  detrol LA

## 2021-06-14 ENCOUNTER — APPOINTMENT (OUTPATIENT)
Dept: NUCLEAR MEDICINE | Facility: HOSPITAL | Age: 56
End: 2021-06-14

## 2021-07-14 ENCOUNTER — LAB (OUTPATIENT)
Dept: LAB | Facility: HOSPITAL | Age: 56
End: 2021-07-14

## 2021-07-14 LAB
ALBUMIN SERPL-MCNC: 4 G/DL (ref 3.5–5.2)
ALBUMIN/GLOB SERPL: 1.1 G/DL
ALP SERPL-CCNC: 82 U/L (ref 39–117)
ALT SERPL W P-5'-P-CCNC: 27 U/L (ref 1–41)
ANION GAP SERPL CALCULATED.3IONS-SCNC: 9.9 MMOL/L (ref 5–15)
AST SERPL-CCNC: 18 U/L (ref 1–40)
BASOPHILS # BLD AUTO: 0.06 10*3/MM3 (ref 0–0.2)
BASOPHILS NFR BLD AUTO: 0.7 % (ref 0–1.5)
BILIRUB SERPL-MCNC: 0.5 MG/DL (ref 0–1.2)
BUN SERPL-MCNC: 15 MG/DL (ref 6–20)
BUN/CREAT SERPL: 14 (ref 7–25)
CALCIUM SPEC-SCNC: 9.5 MG/DL (ref 8.6–10.5)
CHLORIDE SERPL-SCNC: 103 MMOL/L (ref 98–107)
CHOLEST SERPL-MCNC: 125 MG/DL (ref 0–200)
CO2 SERPL-SCNC: 23.1 MMOL/L (ref 22–29)
CREAT SERPL-MCNC: 1.07 MG/DL (ref 0.76–1.27)
DEPRECATED RDW RBC AUTO: 42.7 FL (ref 37–54)
EOSINOPHIL # BLD AUTO: 0.21 10*3/MM3 (ref 0–0.4)
EOSINOPHIL NFR BLD AUTO: 2.5 % (ref 0.3–6.2)
ERYTHROCYTE [DISTWIDTH] IN BLOOD BY AUTOMATED COUNT: 13.1 % (ref 12.3–15.4)
GFR SERPL CREATININE-BSD FRML MDRD: 71 ML/MIN/1.73
GLOBULIN UR ELPH-MCNC: 3.5 GM/DL
GLUCOSE SERPL-MCNC: 95 MG/DL (ref 65–99)
HCT VFR BLD AUTO: 51.1 % (ref 37.5–51)
HDLC SERPL-MCNC: 31 MG/DL (ref 40–60)
HGB BLD-MCNC: 17.3 G/DL (ref 13–17.7)
IMM GRANULOCYTES # BLD AUTO: 0.03 10*3/MM3 (ref 0–0.05)
IMM GRANULOCYTES NFR BLD AUTO: 0.4 % (ref 0–0.5)
LDLC SERPL CALC-MCNC: 78 MG/DL (ref 0–100)
LDLC/HDLC SERPL: 2.54 {RATIO}
LYMPHOCYTES # BLD AUTO: 2.52 10*3/MM3 (ref 0.7–3.1)
LYMPHOCYTES NFR BLD AUTO: 29.6 % (ref 19.6–45.3)
MCH RBC QN AUTO: 30 PG (ref 26.6–33)
MCHC RBC AUTO-ENTMCNC: 33.9 G/DL (ref 31.5–35.7)
MCV RBC AUTO: 88.7 FL (ref 79–97)
MONOCYTES # BLD AUTO: 0.63 10*3/MM3 (ref 0.1–0.9)
MONOCYTES NFR BLD AUTO: 7.4 % (ref 5–12)
NEUTROPHILS NFR BLD AUTO: 5.07 10*3/MM3 (ref 1.7–7)
NEUTROPHILS NFR BLD AUTO: 59.4 % (ref 42.7–76)
NRBC BLD AUTO-RTO: 0.1 /100 WBC (ref 0–0.2)
PLATELET # BLD AUTO: 264 10*3/MM3 (ref 140–450)
PMV BLD AUTO: 9.6 FL (ref 6–12)
POTASSIUM SERPL-SCNC: 4.5 MMOL/L (ref 3.5–5.2)
PROT SERPL-MCNC: 7.5 G/DL (ref 6–8.5)
PSA SERPL-MCNC: 1.73 NG/ML (ref 0–4)
RBC # BLD AUTO: 5.76 10*6/MM3 (ref 4.14–5.8)
SODIUM SERPL-SCNC: 136 MMOL/L (ref 136–145)
TRIGL SERPL-MCNC: 77 MG/DL (ref 0–150)
TSH SERPL DL<=0.05 MIU/L-ACNC: 2.04 UIU/ML (ref 0.27–4.2)
URATE SERPL-MCNC: 4.4 MG/DL (ref 3.4–7)
VLDLC SERPL-MCNC: 16 MG/DL (ref 5–40)
WBC # BLD AUTO: 8.52 10*3/MM3 (ref 3.4–10.8)

## 2021-07-14 PROCEDURE — 80053 COMPREHEN METABOLIC PANEL: CPT | Performed by: INTERNAL MEDICINE

## 2021-07-14 PROCEDURE — 84550 ASSAY OF BLOOD/URIC ACID: CPT | Performed by: INTERNAL MEDICINE

## 2021-07-14 PROCEDURE — 80061 LIPID PANEL: CPT | Performed by: INTERNAL MEDICINE

## 2021-07-14 PROCEDURE — 84443 ASSAY THYROID STIM HORMONE: CPT | Performed by: INTERNAL MEDICINE

## 2021-07-14 PROCEDURE — 85025 COMPLETE CBC W/AUTO DIFF WBC: CPT | Performed by: INTERNAL MEDICINE

## 2021-07-14 PROCEDURE — 84153 ASSAY OF PSA TOTAL: CPT | Performed by: INTERNAL MEDICINE

## 2021-07-14 PROCEDURE — 36415 COLL VENOUS BLD VENIPUNCTURE: CPT | Performed by: INTERNAL MEDICINE

## 2021-07-19 ENCOUNTER — OFFICE VISIT (OUTPATIENT)
Dept: INTERNAL MEDICINE | Facility: CLINIC | Age: 56
End: 2021-07-19

## 2021-07-19 VITALS
OXYGEN SATURATION: 96 % | TEMPERATURE: 97.1 F | WEIGHT: 315 LBS | BODY MASS INDEX: 41.75 KG/M2 | SYSTOLIC BLOOD PRESSURE: 124 MMHG | DIASTOLIC BLOOD PRESSURE: 80 MMHG | HEIGHT: 73 IN | HEART RATE: 80 BPM

## 2021-07-19 DIAGNOSIS — G47.30 SLEEP APNEA, UNSPECIFIED TYPE: ICD-10-CM

## 2021-07-19 DIAGNOSIS — E79.0 ELEVATED BLOOD URIC ACID LEVEL: ICD-10-CM

## 2021-07-19 DIAGNOSIS — N52.9 ERECTILE DYSFUNCTION, UNSPECIFIED ERECTILE DYSFUNCTION TYPE: ICD-10-CM

## 2021-07-19 DIAGNOSIS — I10 BENIGN ESSENTIAL HYPERTENSION: ICD-10-CM

## 2021-07-19 DIAGNOSIS — I25.10 CORONARY ARTERY DISEASE INVOLVING NATIVE CORONARY ARTERY WITHOUT ANGINA PECTORIS, UNSPECIFIED WHETHER NATIVE OR TRANSPLANTED HEART: Primary | ICD-10-CM

## 2021-07-19 DIAGNOSIS — E29.1 HYPOGONADISM IN MALE: ICD-10-CM

## 2021-07-19 DIAGNOSIS — G43.909 MIGRAINE WITHOUT STATUS MIGRAINOSUS, NOT INTRACTABLE, UNSPECIFIED MIGRAINE TYPE: ICD-10-CM

## 2021-07-19 DIAGNOSIS — M17.0 OSTEOARTHRITIS OF BOTH KNEES, UNSPECIFIED OSTEOARTHRITIS TYPE: ICD-10-CM

## 2021-07-19 DIAGNOSIS — E66.01 MORBID OBESITY (HCC): ICD-10-CM

## 2021-07-19 PROBLEM — L91.8 SKIN TAGS, MULTIPLE ACQUIRED: Status: RESOLVED | Noted: 2020-05-12 | Resolved: 2021-07-19

## 2021-07-19 PROBLEM — R55 NEAR SYNCOPE: Status: RESOLVED | Noted: 2020-05-19 | Resolved: 2021-07-19

## 2021-07-19 PROCEDURE — 99214 OFFICE O/P EST MOD 30 MIN: CPT | Performed by: INTERNAL MEDICINE

## 2021-07-19 RX ORDER — IBUPROFEN 600 MG/1
600 TABLET ORAL 2 TIMES DAILY PRN
Qty: 60 TABLET | Refills: 3 | Status: SHIPPED | OUTPATIENT
Start: 2021-07-19 | End: 2022-06-14 | Stop reason: SINTOL

## 2021-07-19 NOTE — PROGRESS NOTES
Subjective   Willi Rayo is a 55 y.o. male.     Chief Complaint   Patient presents with   • Follow-up   • Hyperlipidemia   • Hypertension       History of Present Illness   Patient here for follow-up.  The blood pressure stable on medication.  Sleep apnea stable on CPAP.  Migraine headache is stable on medication.  The uric acid improved to normal on medication.  Arthritis is stable on medication patient is seeing orthopedist.  Hypogonadism patient still take medication helps.  Near syncopal episode CAD patient here to do stress test.    Current Outpatient Medications:   •  allopurinol (ZYLOPRIM) 300 MG tablet, TAKE 1 TABLET BY MOUTH DAILY, Disp: 90 tablet, Rfl: 3  •  aspirin 81 MG chewable tablet, Chew 81 mg Daily., Disp: , Rfl:   •  atorvastatin (LIPITOR) 40 MG tablet, TAKE 1 TABLET BY MOUTH DAILY, Disp: 90 tablet, Rfl: 3  •  ibuprofen (ADVIL,MOTRIN) 600 MG tablet, Take 1 tablet by mouth 2 (Two) Times a Day As Needed for Mild Pain ., Disp: 60 tablet, Rfl: 3  •  meloxicam (MOBIC) 15 MG tablet, Take 15 mg by mouth Daily., Disp: , Rfl:   •  metoprolol succinate XL (TOPROL-XL) 50 MG 24 hr tablet, TAKE 1 TABLET BY MOUTH DAILY, Disp: 90 tablet, Rfl: 3  •  NITROSTAT 0.4 MG SL tablet, PLACE NOHELIA, Disp: , Rfl: 0  •  ondansetron (ZOFRAN) 4 MG tablet, Take 1 tablet by mouth Every 8 (Eight) Hours As Needed for Nausea or Vomiting., Disp: 21 tablet, Rfl: 0  •  SUMAtriptan (IMITREX) 50 MG tablet, Take 1 tablet by mouth 1 (One) Time As Needed for Migraine for up to 1 dose., Disp: 15 tablet, Rfl: 1  •  Testosterone (AXIRON) 30 MG/ACT solution, 1 hattie each axilla daily, Disp: 90 mL, Rfl: 5  •  tolterodine LA (DETROL LA) 4 MG 24 hr capsule, TAKE 1 CAPSULE BY MOUTH DAILY, Disp: 90 capsule, Rfl: 3    The following portions of the patient's history were reviewed and updated as appropriate: allergies, current medications, past family history, past medical history, past social history, past surgical history and problem list.    Review  of Systems   Constitutional: Negative.    Respiratory: Negative.    Cardiovascular: Negative.    Gastrointestinal: Negative.    Musculoskeletal: Negative.    Skin: Negative.    Neurological: Negative.    Psychiatric/Behavioral: Negative.        Objective   Physical Exam  Cardiovascular:      Rate and Rhythm: Normal rate and regular rhythm.      Heart sounds: Normal heart sounds.   Pulmonary:      Effort: Pulmonary effort is normal.      Breath sounds: Normal breath sounds.   Abdominal:      General: Bowel sounds are normal.   Musculoskeletal:      Cervical back: Neck supple.   Skin:     General: Skin is warm.   Neurological:      Mental Status: He is alert and oriented to person, place, and time.         All tests have been reviewed.    Assessment/Plan   Diagnoses and all orders for this visit:    Coronary artery disease involving native coronary artery without angina pectoris, unspecified whether native or transplanted heart pending stress test    Benign essential hypertension cont med    Morbid obesity (CMS/HCC) MORROW pending stress test    Erectile dysfunction, declines medicine    Sleep apnea, cont cpap    Migraine cont med    Elevated blood uric acid level normal on med    Osteoarthritis of both knees,follow ortho and cont med  Hypogonadism in male    Near syncope echo and event monitor and carotid duplex unremarkable, perform stress test     6 mo         Below is to historical records for reference only:   CAD soa with exertion: stress test abnormal and cotninue ASA 81mg ,normal Cath with mild plaque, continue lipitor do lab  HTN continue toprol   migraine stable now  plantar fasciitis continue mobic rest cold decompression  Morbid obesity good diet refer to weight loss center  vitD low continue vitD3 1000iu daily  Colon 7/18/19 repeat 5 years. EGD 7/18/19:  Insomnia daytime sleepy, follow up with sleep study doctor  HLD diet continue medicinef  Prediabetes, 6.1  Knee oa follow up with ortho  Ed do lab  Fatigue,  do lab  Urge incont, cont  detrol LA

## 2021-07-28 ENCOUNTER — OFFICE VISIT (OUTPATIENT)
Dept: INTERNAL MEDICINE | Facility: CLINIC | Age: 56
End: 2021-07-28

## 2021-07-28 ENCOUNTER — OFFICE VISIT (OUTPATIENT)
Dept: PULMONOLOGY | Facility: CLINIC | Age: 56
End: 2021-07-28

## 2021-07-28 VITALS
DIASTOLIC BLOOD PRESSURE: 78 MMHG | RESPIRATION RATE: 18 BRPM | BODY MASS INDEX: 41.75 KG/M2 | HEIGHT: 73 IN | SYSTOLIC BLOOD PRESSURE: 118 MMHG | HEART RATE: 88 BPM | OXYGEN SATURATION: 94 % | WEIGHT: 315 LBS

## 2021-07-28 VITALS
OXYGEN SATURATION: 98 % | WEIGHT: 315 LBS | HEART RATE: 92 BPM | DIASTOLIC BLOOD PRESSURE: 82 MMHG | BODY MASS INDEX: 41.75 KG/M2 | TEMPERATURE: 97.5 F | HEIGHT: 73 IN | SYSTOLIC BLOOD PRESSURE: 132 MMHG

## 2021-07-28 DIAGNOSIS — E66.01 MORBID OBESITY WITH BMI OF 50.0-59.9, ADULT (HCC): ICD-10-CM

## 2021-07-28 DIAGNOSIS — R76.12 POSITIVE QUANTIFERON-TB GOLD TEST: Primary | ICD-10-CM

## 2021-07-28 DIAGNOSIS — G47.33 OSA (OBSTRUCTIVE SLEEP APNEA): Primary | ICD-10-CM

## 2021-07-28 DIAGNOSIS — G47.19 EXCESSIVE DAYTIME SLEEPINESS: ICD-10-CM

## 2021-07-28 PROCEDURE — 99212 OFFICE O/P EST SF 10 MIN: CPT | Performed by: NURSE PRACTITIONER

## 2021-07-28 PROCEDURE — 99213 OFFICE O/P EST LOW 20 MIN: CPT | Performed by: INTERNAL MEDICINE

## 2021-07-28 NOTE — PROGRESS NOTES
Subjective   Willi Rayo is a 55 y.o. male.     Chief Complaint   Patient presents with   • TB Test     positive quantiferon gold       History of Present Illness   TB gold test positive. At yearly screen in [facility] patient has some cough after covid infection. No low fever or night sweat, no sig new fatigue. No hiv hx per patient . Patient is doing blood donation regularly.     Current Outpatient Medications:   •  allopurinol (ZYLOPRIM) 300 MG tablet, TAKE 1 TABLET BY MOUTH DAILY, Disp: 90 tablet, Rfl: 3  •  aspirin 81 MG chewable tablet, Chew 81 mg Daily., Disp: , Rfl:   •  atorvastatin (LIPITOR) 40 MG tablet, TAKE 1 TABLET BY MOUTH DAILY, Disp: 90 tablet, Rfl: 3  •  ibuprofen (ADVIL,MOTRIN) 600 MG tablet, Take 1 tablet by mouth 2 (Two) Times a Day As Needed for Mild Pain ., Disp: 60 tablet, Rfl: 3  •  meloxicam (MOBIC) 15 MG tablet, Take 15 mg by mouth Daily., Disp: , Rfl:   •  metoprolol succinate XL (TOPROL-XL) 50 MG 24 hr tablet, TAKE 1 TABLET BY MOUTH DAILY, Disp: 90 tablet, Rfl: 3  •  NITROSTAT 0.4 MG SL tablet, PLACE NOHELIA, Disp: , Rfl: 0  •  ondansetron (ZOFRAN) 4 MG tablet, Take 1 tablet by mouth Every 8 (Eight) Hours As Needed for Nausea or Vomiting., Disp: 21 tablet, Rfl: 0  •  SUMAtriptan (IMITREX) 50 MG tablet, Take 1 tablet by mouth 1 (One) Time As Needed for Migraine for up to 1 dose., Disp: 15 tablet, Rfl: 1  •  Testosterone (AXIRON) 30 MG/ACT solution, 1 hattie each axilla daily, Disp: 90 mL, Rfl: 5  •  tolterodine LA (DETROL LA) 4 MG 24 hr capsule, TAKE 1 CAPSULE BY MOUTH DAILY, Disp: 90 capsule, Rfl: 3    The following portions of the patient's history were reviewed and updated as appropriate: allergies, current medications, past family history, past medical history, past social history, past surgical history and problem list.    Review of Systems   Constitutional: Negative.    Respiratory: Positive for cough.    Cardiovascular: Negative.    Gastrointestinal: Negative.    Skin: Negative.     Psychiatric/Behavioral: Negative.        Objective   Physical Exam  Constitutional:       Appearance: He is well-developed.   HENT:      Head: Normocephalic and atraumatic.   Cardiovascular:      Rate and Rhythm: Normal rate and regular rhythm.      Heart sounds: Normal heart sounds.   Pulmonary:      Effort: Pulmonary effort is normal.      Breath sounds: Normal breath sounds.   Abdominal:      General: Bowel sounds are normal.      Palpations: Abdomen is soft.   Musculoskeletal:      Cervical back: Neck supple.   Neurological:      Mental Status: He is alert and oriented to person, place, and time.   Psychiatric:         Behavior: Behavior normal.         All tests have been reviewed.    Assessment/Plan   Diagnoses and all orders for this visit:    Positive QuantiFERON-TB Gold test  -     Ambulatory Referral to Pulmonology

## 2021-08-19 ENCOUNTER — HOSPITAL ENCOUNTER (OUTPATIENT)
Dept: CARDIOLOGY | Facility: HOSPITAL | Age: 56
Discharge: HOME | End: 2021-08-19
Admitting: INTERNAL MEDICINE

## 2021-08-19 VITALS
BODY MASS INDEX: 41.75 KG/M2 | HEIGHT: 73 IN | HEART RATE: 71 BPM | DIASTOLIC BLOOD PRESSURE: 82 MMHG | SYSTOLIC BLOOD PRESSURE: 140 MMHG | WEIGHT: 315 LBS

## 2021-08-19 LAB
BH CV REST NUCLEAR ISOTOPE DOSE: 9.7 MCI
BH CV STRESS BP STAGE 2: NORMAL
BH CV STRESS BP STAGE 4: NORMAL
BH CV STRESS COMMENTS STAGE 1: NORMAL
BH CV STRESS DOSE REGADENOSON STAGE 1: 0.4
BH CV STRESS DURATION MIN STAGE 1: 1
BH CV STRESS DURATION MIN STAGE 2: 1
BH CV STRESS DURATION MIN STAGE 3: 1
BH CV STRESS DURATION MIN STAGE 4: 1
BH CV STRESS DURATION SEC STAGE 1: 0
BH CV STRESS DURATION SEC STAGE 2: 0
BH CV STRESS DURATION SEC STAGE 3: 0
BH CV STRESS DURATION SEC STAGE 4: 0
BH CV STRESS HR STAGE 1: 72
BH CV STRESS HR STAGE 2: 94
BH CV STRESS HR STAGE 3: 89
BH CV STRESS HR STAGE 4: 83
BH CV STRESS NUCLEAR ISOTOPE DOSE: 31.9 MCI
BH CV STRESS O2 STAGE 1: 98
BH CV STRESS O2 STAGE 2: 98
BH CV STRESS O2 STAGE 3: 99
BH CV STRESS O2 STAGE 4: 99
BH CV STRESS PROTOCOL 1: NORMAL
BH CV STRESS RECOVERY BP: NORMAL MMHG
BH CV STRESS RECOVERY HR: 82 BPM
BH CV STRESS RECOVERY O2: 99 %
BH CV STRESS STAGE 1: 1
BH CV STRESS STAGE 2: 2
BH CV STRESS STAGE 3: 3
BH CV STRESS STAGE 4: 4
LV EF NUC BP: 79 %
MAXIMAL PREDICTED HEART RATE: 163 BPM
PERCENT MAX PREDICTED HR: 57.06 %
STRESS BASELINE BP: NORMAL MMHG
STRESS BASELINE HR: 71 BPM
STRESS O2 SAT REST: 99 %
STRESS PERCENT HR: 67 %
STRESS POST ESTIMATED WORKLOAD: 1 METS
STRESS POST EXERCISE DUR MIN: 4 MIN
STRESS POST EXERCISE DUR SEC: 0 SEC
STRESS POST O2 SAT PEAK: 99 %
STRESS POST PEAK BP: NORMAL MMHG
STRESS POST PEAK HR: 93 BPM
STRESS TARGET HR: 139 BPM

## 2021-08-19 PROCEDURE — 93018 CV STRESS TEST I&R ONLY: CPT | Performed by: INTERNAL MEDICINE

## 2021-08-19 PROCEDURE — 78452 HT MUSCLE IMAGE SPECT MULT: CPT

## 2021-08-19 PROCEDURE — 93017 CV STRESS TEST TRACING ONLY: CPT

## 2021-08-19 PROCEDURE — 0 TECHNETIUM SESTAMIBI: Performed by: INTERNAL MEDICINE

## 2021-08-19 PROCEDURE — 25010000002 REGADENOSON 0.4 MG/5ML SOLUTION: Performed by: INTERNAL MEDICINE

## 2021-08-19 PROCEDURE — A9500 TC99M SESTAMIBI: HCPCS | Performed by: INTERNAL MEDICINE

## 2021-08-19 PROCEDURE — 78452 HT MUSCLE IMAGE SPECT MULT: CPT | Performed by: INTERNAL MEDICINE

## 2021-08-19 RX ADMIN — REGADENOSON 0.4 MG: 0.08 INJECTION, SOLUTION INTRAVENOUS at 10:30

## 2021-08-19 RX ADMIN — TECHNETIUM TC 99M SESTAMIBI 1 DOSE: 1 INJECTION INTRAVENOUS at 10:35

## 2021-08-19 RX ADMIN — TECHNETIUM TC 99M SESTAMIBI 1 DOSE: 1 INJECTION INTRAVENOUS at 08:25

## 2021-08-20 ENCOUNTER — OFFICE VISIT (OUTPATIENT)
Dept: PULMONOLOGY | Facility: CLINIC | Age: 56
End: 2021-08-20

## 2021-08-20 VITALS
BODY MASS INDEX: 41.75 KG/M2 | RESPIRATION RATE: 18 BRPM | HEART RATE: 77 BPM | WEIGHT: 315 LBS | OXYGEN SATURATION: 96 % | HEIGHT: 73 IN

## 2021-08-20 DIAGNOSIS — E66.01 MORBID OBESITY WITH BMI OF 50.0-59.9, ADULT (HCC): ICD-10-CM

## 2021-08-20 DIAGNOSIS — Z22.7 LTBI (LATENT TUBERCULOSIS INFECTION): ICD-10-CM

## 2021-08-20 DIAGNOSIS — G47.33 OSA (OBSTRUCTIVE SLEEP APNEA): Primary | ICD-10-CM

## 2021-08-20 PROCEDURE — 99214 OFFICE O/P EST MOD 30 MIN: CPT | Performed by: INTERNAL MEDICINE

## 2021-08-20 RX ORDER — LANOLIN ALCOHOL/MO/W.PET/CERES
50 CREAM (GRAM) TOPICAL DAILY
Qty: 30 TABLET | Refills: 3 | Status: SHIPPED | OUTPATIENT
Start: 2021-08-20 | End: 2022-01-11

## 2021-08-20 RX ORDER — ISONIAZID 300 MG/1
900 TABLET ORAL WEEKLY
Qty: 36 TABLET | Refills: 0 | Status: SHIPPED | OUTPATIENT
Start: 2021-08-20 | End: 2021-11-06

## 2021-08-20 NOTE — PROGRESS NOTES
"  Chief Complaint   Patient presents with   • Follow-up   • Sleeping Problem       Subjective   Willi Rayo is a 56 y.o. male.     History of Present Illness   Patient was evaluated today for follow up of Sleep apnea.     Patient doesn't report any issues with the PAP device. The patient describes no significant issues with his mask either.     Patient says that the compliance with the use of the equipment is good.     Patient says that his symptoms of fatigue & daytime sleepiness have been helped greatly with the use of PAP, as prescribed.     The patient comes in today also to discuss recent QuantiFERON test which was positive at [facility].    The patient works at [email or web address].      The following portions of the patient's history were reviewed and updated as appropriate: allergies, current medications, past family history, past medical history, past social history and past surgical history.    Review of Systems   Constitutional: Negative for chills and fever.   HENT: Positive for rhinorrhea. Negative for sinus pressure, sinus pain, sneezing and sore throat.    Respiratory: Positive for cough and shortness of breath (with exertions). Negative for wheezing.    Psychiatric/Behavioral: Negative for sleep disturbance.       Objective   Visit Vitals  Pulse 77   Resp 18   Ht 185.2 cm (72.91\")   Wt (!) 191 kg (420 lb)   SpO2 96%   BMI 55.55 kg/m²       Physical Exam  Vitals reviewed.   Constitutional:       Appearance: He is well-developed.   HENT:      Head: Atraumatic.      Mouth/Throat:      Comments: Oropharynx was crowded.  Neck:      Comments: Increased adipose tissue noted.  Musculoskeletal:      Comments: Gait was normal.   Neurological:      Mental Status: He is alert and oriented to person, place, and time.         Assessment/Plan   Diagnoses and all orders for this visit:    1. VALERIE (obstructive sleep apnea) (Primary)  -     BIPAP / CPAP Adjustment    2. Morbid obesity with BMI of " 50.0-59.9, adult (CMS/[facility])    3. LTBI (latent tuberculosis infection)    Other orders  -     Rifapentine 150 MG tablet; Take 900 mg by mouth 1 (One) Time Per Week for 12 doses.  Dispense: 12 each; Refill: 0  -     isoniazid (NYDRAZID) 300 MG tablet; Take 3 tablets by mouth 1 (One) Time Per Week for 12 doses.  Dispense: 36 tablet; Refill: 0  -     vitamin B-6 (PYRIDOXINE) 50 MG tablet; Take 1 tablet by mouth Daily.  Dispense: 30 tablet; Refill: 3         Return in about 14 weeks (around 11/26/2021) for Give Recall info, Recheck.    DISCUSSION (if any):  I reviewed the results of last sleep study in detail. I informed him that the apnea hypopnea index was 99 / hr. This was home study, performed in Nov 2019.    Continue treatment with AutoPAP at a pressure of 11/18.    Will switch him to Dream wear, as he has occasional issues with his current full face mask.     Patient seems to be compliant with PAP device, based on the available data and his account of improved symptoms.     Compliance data was obtained from the [employer] device/DME company.    Sleep hygiene measures were discussed in great detail including need to follow a strict bedtime and to avoid electronic devices in bed and close to bedtime.    he was also asked to avoid caffeinated or alcoholic beverages within 4 to 6 hours of bedtime.    Weight loss advised.    Patient was informed about the voluntary recall issued by [employer] for some of their PAP devices.  Patient was also urged to register the device with [employer], on their website, to obtain up-to-date information on repair is a replacement.    It appears that as per the recent recommendations, the patient's need to avoid ozone based cleaning methods and also avoid exposure of the devices to high humidity or high temperatures.    Patient with severe obstructive sleep apnea will also need to continue using the PAP devices due to severity of underlying disease and possible adverse outcomes if PAP device is not  "used on a regular basis.    It is not possible to make any predictions with regards to increase in the possibility of health related complications, in the event obstructive sleep apnea is not treated even for short time.    Patients were urged to contact their DME company, regarding their concern for their current PAP devices. We informed them, that we can only order \"PAP devices\" but have no control over which brand of device they receive/received from their DME company. A change in the device will have to be determined by their DME company and insurance company.     We will keep following American Academy of sleep medicine and American thoracic Society guidelines and update recommendations as they are released.    The patient was once again reminded to continue using the PAP device regularly, every night for atleast 4 hours.    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  I told him that the dosage would be  mg plus Rifapentine 900 mg on a weekly basis for 12 weeks.    I also had a long discussion with the patient regarding side effects including but not limited to hepatotoxicity, discoloration of body fluids, possible neutropenia and pyridoxine deficiency with isoniazid.  I also informed the patient regarding interactions with many medications and told him to inform his providers if he is prescribed antibiotics or any new medications in the future, while he is on the therapy.    LFTs will be repeated if clinically indicated.    His latest LFTs were within normal range, performed 1 month ago.    I also reviewed laboratory work-up from [facility], performed on 9 July 2021 which did reveal positive QuantiFERON testing.  His chest x-ray, performed on 19 July 2021 at , showed no acute pulmonary disease.      Dictated " utilizing Dragon dictation.    This document was electronically signed by Celeste Main MD on 08/20/21 at 10:52 EDT

## 2021-09-22 DIAGNOSIS — Z01.818 PREOP EXAMINATION: Primary | ICD-10-CM

## 2021-10-19 DIAGNOSIS — I10 BENIGN ESSENTIAL HYPERTENSION: ICD-10-CM

## 2021-10-19 RX ORDER — METOPROLOL SUCCINATE 50 MG/1
50 TABLET, EXTENDED RELEASE ORAL DAILY
Qty: 90 TABLET | Refills: 3 | Status: SHIPPED | OUTPATIENT
Start: 2021-10-19 | End: 2021-12-18 | Stop reason: SDUPTHER

## 2021-11-03 ENCOUNTER — OFFICE VISIT (OUTPATIENT)
Dept: INTERNAL MEDICINE | Facility: CLINIC | Age: 56
End: 2021-11-03

## 2021-11-03 VITALS
HEIGHT: 73 IN | TEMPERATURE: 97.5 F | SYSTOLIC BLOOD PRESSURE: 142 MMHG | OXYGEN SATURATION: 98 % | DIASTOLIC BLOOD PRESSURE: 82 MMHG | WEIGHT: 315 LBS | HEART RATE: 76 BPM | BODY MASS INDEX: 41.75 KG/M2

## 2021-11-03 DIAGNOSIS — Z01.818 PREOP EXAMINATION: Primary | ICD-10-CM

## 2021-11-03 DIAGNOSIS — E66.01 MORBID OBESITY (HCC): ICD-10-CM

## 2021-11-03 PROCEDURE — 99214 OFFICE O/P EST MOD 30 MIN: CPT | Performed by: INTERNAL MEDICINE

## 2021-11-03 NOTE — PROGRESS NOTES
Subjective   Willi Rayo is a 56 y.o. male who presents to the office today for a preoperative consultation at the request of surgeon who plans on performing weight loss surgery  on date yet to set. This consultation is requested for the specific conditions prompting preoperative evaluation (i.e. because of potential affect on operative risk):CAD  HTN. Planned anesthesia: general. The patient has the following known anesthesia issues: na. Patients bleeding risk: no recent abnormal bleeding. Patient does not have objections to receiving blood products if needed.    The following portions of the patient's history were reviewed and updated as appropriate: allergies, current medications, past family history, past medical history, past social history, past surgical history and problem list.    Review of Systems  Review of Systems   Constitutional: Negative.    Respiratory: Negative.    Cardiovascular: Negative.    Gastrointestinal: Negative.    Musculoskeletal: Negative.    Skin: Negative.    Neurological: Negative.    Psychiatric/Behavioral: Negative.        Objective   Physical Exam  Constitutional:       Appearance: He is obese.   Cardiovascular:      Rate and Rhythm: Normal rate and regular rhythm.      Heart sounds: Normal heart sounds.   Pulmonary:      Effort: Pulmonary effort is normal.      Breath sounds: Normal breath sounds.   Abdominal:      General: Bowel sounds are normal.   Musculoskeletal:      Cervical back: Neck supple.   Skin:     General: Skin is warm.   Neurological:      Mental Status: He is alert and oriented to person, place, and time.         All  tests have been reviewed.    Cardiographics  ECG: normal sinus rhythm, no blocks or conduction defects, no ischemic changes  Echocardiogram: normal and reviewed by myself    Imaging  Chest x-ray: not done     Lab Review   Office Visit on 06/03/2021   Component Date Value   • Target HR (85%) 08/19/2021 139    • Max. Pred. HR (100%) 08/19/2021 163    •  BH CV STRESS PROTOCOL 1 08/19/2021 Pharmacologic    • Stage 1 08/19/2021 1    • Duration Min Stage 1 08/19/2021 1    • Duration Sec Stage 1 08/19/2021 0    • Stress Dose Regadenoson * 08/19/2021 0.4    • Stress Comments Stage 1 08/19/2021 10 sec bolus injection    • Stage 2 08/19/2021 2    • Duration Min Stage 2 08/19/2021 1    • Duration Sec Stage 2 08/19/2021 0    • Stage 3 08/19/2021 3    • Duration Min Stage 3 08/19/2021 1    • Duration Sec Stage 3 08/19/2021 0    • Stage 4 08/19/2021 4    • Duration Min Stage 4 08/19/2021 1    • Duration Sec Stage 4 08/19/2021 0    • Baseline HR 08/19/2021 71    • Baseline BP 08/19/2021 140/82    • O2 sat rest 08/19/2021 99    • Exercise duration (min) 08/19/2021 4    • Exercise duration (sec) 08/19/2021 0    • Estimated workload 08/19/2021 1.0    • HR Stage 1 08/19/2021 72    • O2 Stage 1 08/19/2021 98    • HR Stage 2 08/19/2021 94    • BP Stage 2 08/19/2021 146/80    • O2 Stage 2 08/19/2021 98    • HR Stage 3 08/19/2021 89    • O2 Stage 3 08/19/2021 99    • HR Stage 4 08/19/2021 83    • BP Stage 4 08/19/2021 122/80    • O2 Stage 4 08/19/2021 99    • Peak HR 08/19/2021 93    • Percent Max Pred HR 08/19/2021 57.06    • Percent Target HR 08/19/2021 67    • Peak BP 08/19/2021 146/80    • O2 sat peak 08/19/2021 99    • Recovery HR 08/19/2021 82    • Recovery BP 08/19/2021 140/84    • Recovery O2 08/19/2021 99    • BH CV REST NUCLEAR ISOTO* 08/19/2021 9.7    • BH CV STRESS NUCLEAR ISO* 08/19/2021 31.9    • Nuc Stress EF 08/19/2021 79    • Glucose 07/14/2021 95    • BUN 07/14/2021 15    • Creatinine 07/14/2021 1.07    • Sodium 07/14/2021 136    • Potassium 07/14/2021 4.5    • Chloride 07/14/2021 103    • CO2 07/14/2021 23.1    • Calcium 07/14/2021 9.5    • Total Protein 07/14/2021 7.5    • Albumin 07/14/2021 4.00    • ALT (SGPT) 07/14/2021 27    • AST (SGOT) 07/14/2021 18    • Alkaline Phosphatase 07/14/2021 82    • Total Bilirubin 07/14/2021 0.5    • eGFR Non  Amer  07/14/2021 71    • Globulin 07/14/2021 3.5    • A/G Ratio 07/14/2021 1.1    • BUN/Creatinine Ratio 07/14/2021 14.0    • Anion Gap 07/14/2021 9.9    • Total Cholesterol 07/14/2021 125    • Triglycerides 07/14/2021 77    • HDL Cholesterol 07/14/2021 31*   • LDL Cholesterol  07/14/2021 78    • VLDL Cholesterol 07/14/2021 16    • LDL/HDL Ratio 07/14/2021 2.54    • TSH 07/14/2021 2.040    • Uric Acid 07/14/2021 4.4    • PSA 07/14/2021 1.730    • WBC 07/14/2021 8.52    • RBC 07/14/2021 5.76    • Hemoglobin 07/14/2021 17.3    • Hematocrit 07/14/2021 51.1*   • MCV 07/14/2021 88.7    • MCH 07/14/2021 30.0    • MCHC 07/14/2021 33.9    • RDW 07/14/2021 13.1    • RDW-SD 07/14/2021 42.7    • MPV 07/14/2021 9.6    • Platelets 07/14/2021 264    • Neutrophil % 07/14/2021 59.4    • Lymphocyte % 07/14/2021 29.6    • Monocyte % 07/14/2021 7.4    • Eosinophil % 07/14/2021 2.5    • Basophil % 07/14/2021 0.7    • Immature Grans % 07/14/2021 0.4    • Neutrophils, Absolute 07/14/2021 5.07    • Lymphocytes, Absolute 07/14/2021 2.52    • Monocytes, Absolute 07/14/2021 0.63    • Eosinophils, Absolute 07/14/2021 0.21    • Basophils, Absolute 07/14/2021 0.06    • Immature Grans, Absolute 07/14/2021 0.03    • nRBC 07/14/2021 0.1        Assessment/Plan   56 y.o. male with planned surgery as above.    Known risk factors for perioperative complications: Coronary disease        Cardiac Risk Estimation: HTN, CAD    Current medications which may produce withdrawal symptoms if withheld perioperatively: NA    1. Preoperative workup as follows none.  2. Change in medication regimen before surgery: none, continue medication regimen including morning of surgery, with sip of water.  3. Prophylaxis for cardiac events with perioperative beta-blockers: not indicated.  4. Deep vein thrombosis prophylaxis postoperatively:regimen to be chosen by surgical team.  5, sleep apnea need to use cpap during surgery.     No additional EKG CXR or labs are needed per  patient.  Stress test in 9/2021 showed low risk for heart attack. Echo: 5/2020 [facility]  No apparent contraindication for bariatric surgery.

## 2021-11-08 ENCOUNTER — TELEPHONE (OUTPATIENT)
Dept: INTERNAL MEDICINE | Facility: CLINIC | Age: 56
End: 2021-11-08

## 2021-11-08 NOTE — TELEPHONE ENCOUNTER
Contacted patient and he stated that he needed pre-op clearance from office visit 11/03/2021 faxed to [facility] [phone] Att: KAIT Roberts

## 2021-11-08 NOTE — TELEPHONE ENCOUNTER
Caller: Willi Rayo    Relationship: Self    Best call back number: [phone]     What form or medical record are you requesting: DOCUMENTATION FOR APPROVAL FOR WEIGHTLOSS SURGERY FOR CLEARANCE  Who is requesting this form or medical record from you: THE PATIENT     How would you like to receive the form or medical records (pick-up, mail, fax): FAX  If fax, what is the fax number:[phone] ATTN: ARACELI WARD    Timeframe paperwork needed: SOON PLEASE     Additional notes: THE PATIENT REPORTS THAT HE SAW DR BRINK ON 11/03/2021 AND STATES THAT [facility] STATES THEY HAVE NOT RECEIVED THE PAPERWORK. PLEASE CALL THE PATIENT IF ANY QUESTIONS -[phone].

## 2021-11-19 DIAGNOSIS — R11.2 NON-INTRACTABLE VOMITING WITH NAUSEA, UNSPECIFIED VOMITING TYPE: ICD-10-CM

## 2021-11-19 RX ORDER — SUMATRIPTAN 50 MG/1
50 TABLET, FILM COATED ORAL ONCE AS NEEDED
Qty: 15 TABLET | Refills: 1 | Status: SHIPPED | OUTPATIENT
Start: 2021-11-19

## 2021-11-19 NOTE — TELEPHONE ENCOUNTER
Rx Refill Note  Requested Prescriptions     Pending Prescriptions Disp Refills   • ondansetron (Zofran) 4 MG tablet 21 tablet 0     Sig: Take 1 tablet by mouth Every 8 (Eight) Hours As Needed for Nausea or Vomiting.      Last office visit with prescribing clinician: 11/03/2021      Next office visit with prescribing clinician: 01/11/2022           Arminda Moore LPN  11/19/21, 15:42 EST

## 2021-11-19 NOTE — TELEPHONE ENCOUNTER
Rx Refill Note  Requested Prescriptions     Pending Prescriptions Disp Refills   • SUMAtriptan (IMITREX) 50 MG tablet 15 tablet 1     Sig: Take 1 tablet by mouth 1 (One) Time As Needed for Migraine for up to 1 dose.      Last office visit with prescribing clinician: 11/3/2021      Next office visit with prescribing clinician: 1/11/2022            Imelda Morrison MA  11/19/21, 12:32 EST

## 2021-11-22 RX ORDER — ONDANSETRON 4 MG/1
4 TABLET, FILM COATED ORAL EVERY 8 HOURS PRN
Qty: 21 TABLET | Refills: 0 | Status: SHIPPED | OUTPATIENT
Start: 2021-11-22

## 2021-12-18 DIAGNOSIS — I10 BENIGN ESSENTIAL HYPERTENSION: ICD-10-CM

## 2021-12-20 DIAGNOSIS — E79.0 ELEVATED BLOOD URIC ACID LEVEL: ICD-10-CM

## 2021-12-20 RX ORDER — METOPROLOL SUCCINATE 50 MG/1
50 TABLET, EXTENDED RELEASE ORAL DAILY
Qty: 90 TABLET | Refills: 3 | Status: SHIPPED | OUTPATIENT
Start: 2021-12-20 | End: 2022-08-04

## 2021-12-20 RX ORDER — ALLOPURINOL 300 MG/1
300 TABLET ORAL DAILY
Qty: 90 TABLET | Refills: 3 | Status: SHIPPED | OUTPATIENT
Start: 2021-12-20 | End: 2022-08-04

## 2021-12-20 NOTE — TELEPHONE ENCOUNTER
Rx Refill Note  Requested Prescriptions     Pending Prescriptions Disp Refills   • metoprolol succinate XL (TOPROL-XL) 50 MG 24 hr tablet 90 tablet 3     Sig: Take 1 tablet by mouth Daily.      Last office visit with prescribing clinician: 11/3/2021      Next office visit with prescribing clinician: 12/20/2021            Imelda Morrison MA  12/20/21, 10:13 EST

## 2022-01-11 ENCOUNTER — OFFICE VISIT (OUTPATIENT)
Dept: INTERNAL MEDICINE | Facility: CLINIC | Age: 57
End: 2022-01-11

## 2022-01-11 VITALS
WEIGHT: 315 LBS | HEIGHT: 73 IN | HEART RATE: 75 BPM | SYSTOLIC BLOOD PRESSURE: 130 MMHG | DIASTOLIC BLOOD PRESSURE: 84 MMHG | OXYGEN SATURATION: 98 % | BODY MASS INDEX: 41.75 KG/M2 | TEMPERATURE: 97.5 F

## 2022-01-11 DIAGNOSIS — E66.01 MORBID OBESITY: ICD-10-CM

## 2022-01-11 DIAGNOSIS — N39.41 URGE INCONTINENCE: ICD-10-CM

## 2022-01-11 DIAGNOSIS — E29.1 HYPOGONADISM IN MALE: ICD-10-CM

## 2022-01-11 DIAGNOSIS — I25.10 CORONARY ARTERY DISEASE INVOLVING NATIVE CORONARY ARTERY WITHOUT ANGINA PECTORIS, UNSPECIFIED WHETHER NATIVE OR TRANSPLANTED HEART: ICD-10-CM

## 2022-01-11 DIAGNOSIS — I10 BENIGN ESSENTIAL HYPERTENSION: Primary | ICD-10-CM

## 2022-01-11 DIAGNOSIS — R55 NEAR SYNCOPE: ICD-10-CM

## 2022-01-11 DIAGNOSIS — M17.0 OSTEOARTHRITIS OF BOTH KNEES, UNSPECIFIED OSTEOARTHRITIS TYPE: ICD-10-CM

## 2022-01-11 PROBLEM — Z01.818 PREOP EXAMINATION: Status: RESOLVED | Noted: 2021-11-03 | Resolved: 2022-01-11

## 2022-01-11 PROCEDURE — 99214 OFFICE O/P EST MOD 30 MIN: CPT | Performed by: INTERNAL MEDICINE

## 2022-01-11 RX ORDER — TOLTERODINE 4 MG/1
4 CAPSULE, EXTENDED RELEASE ORAL DAILY
Qty: 90 CAPSULE | Refills: 3 | Status: SHIPPED | OUTPATIENT
Start: 2022-01-11 | End: 2023-03-08 | Stop reason: SDUPTHER

## 2022-01-11 RX ORDER — ATORVASTATIN CALCIUM 40 MG/1
40 TABLET, FILM COATED ORAL DAILY
Qty: 90 TABLET | Refills: 3 | Status: SHIPPED | OUTPATIENT
Start: 2022-01-11 | End: 2022-06-23

## 2022-01-11 NOTE — PROGRESS NOTES
Subjective   Willi Rayo is a 56 y.o. male.     Chief Complaint   Patient presents with   • Follow-up   • Hypertension   • Hyperlipidemia       History of Present Illness   Patient here for follow-up.  Patient last about 47 pounds on purpose.  Patient does have a scheduled bariatric surgery in 20 days.  Cardiovascular disease is stable stress test normal blood pressure stable on medication ED stable sleep apnea on CPAP and stable knee joint pain still right side worse certain position worse no more  near passing out.    Current Outpatient Medications:   •  allopurinol (ZYLOPRIM) 300 MG tablet, TAKE 1 TABLET BY MOUTH DAILY, Disp: 90 tablet, Rfl: 3  •  aspirin 81 MG chewable tablet, Chew 81 mg Daily., Disp: , Rfl:   •  atorvastatin (LIPITOR) 40 MG tablet, Take 1 tablet by mouth Daily., Disp: 90 tablet, Rfl: 3  •  ibuprofen (ADVIL,MOTRIN) 600 MG tablet, Take 1 tablet by mouth 2 (Two) Times a Day As Needed for Mild Pain ., Disp: 60 tablet, Rfl: 3  •  meloxicam (MOBIC) 15 MG tablet, Take 15 mg by mouth Daily., Disp: , Rfl:   •  metoprolol succinate XL (TOPROL-XL) 50 MG 24 hr tablet, Take 1 tablet by mouth Daily., Disp: 90 tablet, Rfl: 3  •  NITROSTAT 0.4 MG SL tablet, PLACE NOHELIA, Disp: , Rfl: 0  •  ondansetron (Zofran) 4 MG tablet, Take 1 tablet by mouth Every 8 (Eight) Hours As Needed for Nausea or Vomiting., Disp: 21 tablet, Rfl: 0  •  SUMAtriptan (IMITREX) 50 MG tablet, Take 1 tablet by mouth 1 (One) Time As Needed for Migraine for up to 1 dose., Disp: 15 tablet, Rfl: 1  •  Testosterone (AXIRON) 30 MG/ACT solution, 1 hattie each axilla daily, Disp: 90 mL, Rfl: 5  •  tolterodine LA (DETROL LA) 4 MG 24 hr capsule, Take 1 capsule by mouth Daily., Disp: 90 capsule, Rfl: 3    The following portions of the patient's history were reviewed and updated as appropriate: allergies, current medications, past family history, past medical history, past social history, past surgical history and problem list.    Review of Systems    Constitutional: Negative.    Respiratory: Negative.    Cardiovascular: Negative.    Gastrointestinal: Negative.    Musculoskeletal: Negative.    Skin: Negative.    Neurological: Negative.    Psychiatric/Behavioral: Negative.        Objective   Physical Exam  Cardiovascular:      Rate and Rhythm: Normal rate and regular rhythm.      Heart sounds: Normal heart sounds.   Pulmonary:      Effort: Pulmonary effort is normal.      Breath sounds: Normal breath sounds.   Abdominal:      General: Bowel sounds are normal.   Musculoskeletal:      Cervical back: Neck supple.   Skin:     General: Skin is warm.   Neurological:      Mental Status: He is alert and oriented to person, place, and time.         All tests have been reviewed.    Assessment/Plan   Diagnoses and all orders for this visit:      Coronary artery disease involving native coronary artery without angina pectoris, unspecified whether native or transplanted heart  stress test normal     Benign essential hypertension cont med     Morbid obesity (CMS/[facility])  weight loss 47 pounds on purpose.  Counseling for patient upcoming bariatric surgery.  If patient wants to postpone and continue to lose weight on himself it is not a bad idea.     Erectile dysfunction, declines medicine     Sleep apnea, cont cpap     Migraine cont med     Elevated blood uric acid level normal on med     Osteoarthritis of both knees,follow ortho and cont med  Hypogonadism in male     Near syncope echo and event monitor and carotid duplex unremarkable, stress test normal     6 months physical       Below is to historical records for reference only:   CAD soa with exertion: stress test abnormal and cotninue ASA 81mg ,normal Cath with mild plaque, continue lipitor do lab  HTN continue toprol   migraine stable now  plantar fasciitis continue mobic rest cold decompression  Morbid obesity good diet refer to weight loss center  vitD low continue vitD3 1000iu daily  Colon 7/18/19 repeat 5 years. EGD  7/18/19:  Insomnia daytime sleepy, follow up with sleep study doctor  HLD diet continue medicinef  Prediabetes, 6.1  Knee oa follow up with ortho  Ed do lab  Fatigue, do lab  Urge incont, cont  detrol LA   Answers for HPI/ROS submitted by the patient on 1/11/2022  What is the primary reason for your visit?: Other  Please describe your symptoms.: Follow up, I reckon  Have you had these symptoms before?: No  How long have you been having these symptoms?: Greater than 2 weeks

## 2022-02-07 ENCOUNTER — OFFICE VISIT (OUTPATIENT)
Dept: PULMONOLOGY | Facility: CLINIC | Age: 57
End: 2022-02-07

## 2022-02-07 VITALS
HEIGHT: 73 IN | HEART RATE: 72 BPM | BODY MASS INDEX: 41.75 KG/M2 | OXYGEN SATURATION: 96 % | DIASTOLIC BLOOD PRESSURE: 64 MMHG | WEIGHT: 315 LBS | SYSTOLIC BLOOD PRESSURE: 118 MMHG | RESPIRATION RATE: 18 BRPM

## 2022-02-07 DIAGNOSIS — G47.33 OSA (OBSTRUCTIVE SLEEP APNEA): Primary | ICD-10-CM

## 2022-02-07 DIAGNOSIS — Z22.7 LTBI (LATENT TUBERCULOSIS INFECTION): ICD-10-CM

## 2022-02-07 DIAGNOSIS — E66.01 MORBID OBESITY WITH BMI OF 50.0-59.9, ADULT: ICD-10-CM

## 2022-02-07 PROCEDURE — 99214 OFFICE O/P EST MOD 30 MIN: CPT | Performed by: INTERNAL MEDICINE

## 2022-02-07 NOTE — PROGRESS NOTES
"  Chief Complaint   Patient presents with   • Follow-up   • Sleeping Problem       Subjective   Willi Rayo is a 56 y.o. male.     History of Present Illness   Patient was evaluated today for follow up of Sleep apnea.     Patient doesn't report any issues with the PAP device. The patient describes some issues with his mask where it leaks in the middle of the night, when he shifts in the middle of the night.      Patient says that the compliance with the use of the equipment is good.     Patient says that his symptoms of fatigue & daytime sleepiness have been helped greatly with the use of PAP, as prescribed.     He finished his INH and Rifapentine in October, after taking this weekly for 12 weeks.       The following portions of the patient's history were reviewed and updated as appropriate: allergies, current medications, past family history, past medical history, past social history and past surgical history.    Review of Systems   HENT: Positive for sinus pain.    Respiratory: Positive for cough.        Objective   Visit Vitals  /64   Pulse 72   Resp 18   Ht 185.2 cm (72.91\")   Wt (!) 171 kg (376 lb)   SpO2 96%   BMI 49.73 kg/m²       Physical Exam  Vitals reviewed.   Constitutional:       Appearance: He is well-developed.   HENT:      Head: Atraumatic.      Mouth/Throat:      Comments: Oropharynx was crowded.  Neck:      Comments: Increased adipose tissue noted.  Musculoskeletal:      Comments: Gait was normal.   Neurological:      Mental Status: He is alert and oriented to person, place, and time.           Assessment/Plan   Diagnoses and all orders for this visit:    1. VALERIE (obstructive sleep apnea) (Primary)  -     BIPAP / CPAP Adjustment    2. Morbid obesity with BMI of 50.0-59.9, adult (HCC)  -     BIPAP / CPAP Adjustment    3. LTBI (latent tuberculosis infection)           Return in about 9 months (around 11/7/2022) for Give Recall info, Dominic/Lissette.    DISCUSSION (if any):  I reviewed the " results of last sleep study in detail. I informed him that the apnea hypopnea index was 99 / hr. This was home study, done in 2019.     I told the patient that his symptoms are consistent with partially controlled sleep apnea.    It appears that some of his issues are secondary to the mask.  The patient was provided with Dream wear full face mask prescription. We will ask the DME company to provide him with the same mask to use at home.     Patient was advised to continue using PAP for at least 4 hours every night.    Patient was advised to call this office with any issues.    He finished treatment for LTBI.    I told him that despite taking the prescribed treatment with INH and Rifapentine, his Quantiferon may still remain positive.     Patient is aware of the recall and he has registered the device with [employer]    Patient can look into adding inline filter and to consider changing it on a regular basis.      I spent a total of 32 minutes face to face with this patient. his pertinent current and previous data, as applicable, were reviewed with the patient. Patient's diagnostic studies were also reviewed, including previous sleep study. We also discussed sleep hygiene measures and measures to increase compliance with the CPAP device.  Time also includes documentation in the chart and reviewing data in the chart.         Dictated utilizing Dragon dictation.    This document was electronically signed by Celeste Main MD on 02/07/22 at 10:53 EST

## 2022-06-14 ENCOUNTER — OFFICE VISIT (OUTPATIENT)
Dept: INTERNAL MEDICINE | Facility: CLINIC | Age: 57
End: 2022-06-14

## 2022-06-14 VITALS
HEART RATE: 84 BPM | DIASTOLIC BLOOD PRESSURE: 80 MMHG | BODY MASS INDEX: 40.82 KG/M2 | TEMPERATURE: 97.1 F | WEIGHT: 308 LBS | HEIGHT: 73 IN | RESPIRATION RATE: 16 BRPM | SYSTOLIC BLOOD PRESSURE: 134 MMHG | OXYGEN SATURATION: 98 %

## 2022-06-14 DIAGNOSIS — N52.9 ERECTILE DYSFUNCTION, UNSPECIFIED ERECTILE DYSFUNCTION TYPE: ICD-10-CM

## 2022-06-14 DIAGNOSIS — M1A.9XX0 CHRONIC GOUT WITHOUT TOPHUS, UNSPECIFIED CAUSE, UNSPECIFIED SITE: ICD-10-CM

## 2022-06-14 DIAGNOSIS — F43.20 ADJUSTMENT REACTION TO MEDICAL THERAPY: Primary | ICD-10-CM

## 2022-06-14 DIAGNOSIS — I10 BENIGN ESSENTIAL HYPERTENSION: ICD-10-CM

## 2022-06-14 PROCEDURE — 99214 OFFICE O/P EST MOD 30 MIN: CPT | Performed by: NURSE PRACTITIONER

## 2022-06-14 RX ORDER — TADALAFIL 10 MG/1
10 TABLET ORAL DAILY PRN
Qty: 15 TABLET | Refills: 5 | Status: SHIPPED | OUTPATIENT
Start: 2022-06-14 | End: 2022-06-16

## 2022-06-14 RX ORDER — COLCHICINE 0.6 MG/1
TABLET ORAL
Qty: 3 TABLET | Refills: 5 | Status: SHIPPED | OUTPATIENT
Start: 2022-06-14

## 2022-06-14 RX ORDER — OMEPRAZOLE 20 MG/1
CAPSULE, DELAYED RELEASE ORAL
COMMUNITY
Start: 2022-04-06 | End: 2022-08-04

## 2022-06-16 DIAGNOSIS — N52.9 ERECTILE DYSFUNCTION, UNSPECIFIED ERECTILE DYSFUNCTION TYPE: Primary | ICD-10-CM

## 2022-06-16 RX ORDER — TADALAFIL 5 MG/1
5 TABLET ORAL DAILY PRN
Qty: 30 TABLET | Refills: 0 | Status: SHIPPED | OUTPATIENT
Start: 2022-06-16 | End: 2022-08-04 | Stop reason: SDUPTHER

## 2022-06-23 DIAGNOSIS — I25.10 CORONARY ARTERY DISEASE INVOLVING NATIVE CORONARY ARTERY WITHOUT ANGINA PECTORIS, UNSPECIFIED WHETHER NATIVE OR TRANSPLANTED HEART: ICD-10-CM

## 2022-06-23 RX ORDER — ATORVASTATIN CALCIUM 40 MG/1
40 TABLET, FILM COATED ORAL DAILY
Qty: 90 TABLET | Refills: 3 | Status: SHIPPED | OUTPATIENT
Start: 2022-06-23

## 2022-06-23 NOTE — TELEPHONE ENCOUNTER
Rx Refill Note  Requested Prescriptions     Pending Prescriptions Disp Refills   • atorvastatin (LIPITOR) 40 MG tablet [Pharmacy Med Name: ATORVASTATIN 40MG TABLETS] 90 tablet 3     Sig: TAKE 1 TABLET BY MOUTH DAILY      Last office visit with prescribing clinician: 1/11/2022      Next office visit with prescribing clinician: Visit date not found            Imelda Morrison MA  06/23/22, 10:10 EDT

## 2022-08-04 ENCOUNTER — OFFICE VISIT (OUTPATIENT)
Dept: INTERNAL MEDICINE | Facility: CLINIC | Age: 57
End: 2022-08-04

## 2022-08-04 VITALS
TEMPERATURE: 97.1 F | SYSTOLIC BLOOD PRESSURE: 112 MMHG | WEIGHT: 293 LBS | OXYGEN SATURATION: 98 % | DIASTOLIC BLOOD PRESSURE: 76 MMHG | HEIGHT: 73 IN | HEART RATE: 67 BPM | BODY MASS INDEX: 38.83 KG/M2

## 2022-08-04 DIAGNOSIS — G43.909 MIGRAINE WITHOUT STATUS MIGRAINOSUS, NOT INTRACTABLE, UNSPECIFIED MIGRAINE TYPE: ICD-10-CM

## 2022-08-04 DIAGNOSIS — E79.0 ELEVATED BLOOD URIC ACID LEVEL: ICD-10-CM

## 2022-08-04 DIAGNOSIS — I25.10 CORONARY ARTERY DISEASE INVOLVING NATIVE CORONARY ARTERY WITHOUT ANGINA PECTORIS, UNSPECIFIED WHETHER NATIVE OR TRANSPLANTED HEART: ICD-10-CM

## 2022-08-04 DIAGNOSIS — N39.41 URGE INCONTINENCE: ICD-10-CM

## 2022-08-04 DIAGNOSIS — M17.0 OSTEOARTHRITIS OF BOTH KNEES, UNSPECIFIED OSTEOARTHRITIS TYPE: ICD-10-CM

## 2022-08-04 DIAGNOSIS — N52.9 ERECTILE DYSFUNCTION, UNSPECIFIED ERECTILE DYSFUNCTION TYPE: ICD-10-CM

## 2022-08-04 DIAGNOSIS — Z00.00 ANNUAL PHYSICAL EXAM: Primary | ICD-10-CM

## 2022-08-04 DIAGNOSIS — E66.01 MORBID OBESITY: ICD-10-CM

## 2022-08-04 DIAGNOSIS — I10 BENIGN ESSENTIAL HYPERTENSION: ICD-10-CM

## 2022-08-04 DIAGNOSIS — G47.30 SLEEP APNEA, UNSPECIFIED TYPE: ICD-10-CM

## 2022-08-04 PROBLEM — R55 NEAR SYNCOPE: Status: RESOLVED | Noted: 2020-05-19 | Resolved: 2022-08-04

## 2022-08-04 PROBLEM — R73.03 PREDIABETES: Status: ACTIVE | Noted: 2022-08-04

## 2022-08-04 PROCEDURE — 99396 PREV VISIT EST AGE 40-64: CPT | Performed by: INTERNAL MEDICINE

## 2022-08-04 RX ORDER — TADALAFIL 5 MG/1
5 TABLET ORAL DAILY PRN
Qty: 90 TABLET | Refills: 1 | Status: SHIPPED | OUTPATIENT
Start: 2022-08-04

## 2022-08-04 RX ORDER — TADALAFIL 5 MG/1
5 TABLET ORAL DAILY PRN
Qty: 30 TABLET | Refills: 0 | Status: SHIPPED | OUTPATIENT
Start: 2022-08-04 | End: 2022-08-04

## 2022-08-04 NOTE — TELEPHONE ENCOUNTER
Rx Refill Note  Requested Prescriptions     Pending Prescriptions Disp Refills   • tadalafil (CIALIS) 5 MG tablet [Pharmacy Med Name: TADALAFIL 5MG TABLETS] 90 tablet      Sig: TAKE 1 TABLET BY MOUTH DAILY AS NEEDED FOR ERECTILE DYSFUNCTION      Last office visit with prescribing clinician: 8/4/2022      Next office visit with prescribing clinician: Visit date not found            Bhumi Back LPN  08/04/22, 10:59 EDT

## 2022-08-04 NOTE — PROGRESS NOTES
Subjective   Willi Rayo is a 57 y.o. male and is here for a comprehensive physical exam.     Do you take any herbs or supplements that were not prescribed by a doctor? no  Are you taking calcium supplements? no  Are you taking aspirin daily? no      The following portions of the patient's history were reviewed and updated as appropriate: allergies, current medications, past family history, past medical history, past social history, past surgical history and problem list.      Review of Systems   Constitutional: Negative.    HENT: Negative.    Eyes: Negative.    Respiratory: Negative.    Cardiovascular: Negative.    Gastrointestinal: Negative.    Endocrine: Negative.    Genitourinary: Negative.    Musculoskeletal: Negative.    Skin: Negative.    Allergic/Immunologic: Negative.    Neurological: Negative.    Hematological: Negative.    Psychiatric/Behavioral: Negative.    All other systems reviewed and are negative.        Physical Exam  Vitals and nursing note reviewed.   Constitutional:       Appearance: He is well-developed.   HENT:      Head: Normocephalic and atraumatic.      Right Ear: External ear normal.      Left Ear: External ear normal.      Nose: Nose normal.   Eyes:      Conjunctiva/sclera: Conjunctivae normal.      Pupils: Pupils are equal, round, and reactive to light.   Neck:      Thyroid: No thyromegaly.   Cardiovascular:      Rate and Rhythm: Normal rate and regular rhythm.      Heart sounds: Normal heart sounds.   Pulmonary:      Effort: Pulmonary effort is normal.      Breath sounds: Normal breath sounds.   Abdominal:      General: Bowel sounds are normal.      Palpations: Abdomen is soft.   Genitourinary:     Penis: Normal.       Prostate: Normal.      Rectum: Normal.   Musculoskeletal:         General: Normal range of motion.      Cervical back: Normal range of motion and neck supple.   Skin:     General: Skin is warm and dry.   Neurological:      Mental Status: He is alert and oriented to  person, place, and time.      Deep Tendon Reflexes: Reflexes are normal and symmetric.   Psychiatric:         Behavior: Behavior normal.         Thought Content: Thought content normal.         Judgment: Judgment normal.         All  tests have been reviewed.    Assessment & Plan          1. Patient Counseling:  --Nutrition: Stressed importance of moderation in sodium/caffeine intake, saturated fat and cholesterol, caloric balance, sufficient intake of fresh fruits, vegetables, fiber, calcium and iron.  --Exercise: Stressed the importance of regular exercise.   --Injury prevention: Discussed safety belts, safety helmets, smoke detector, smoking near bedding or upholstery.   --Dental health: Discussed importance of regular tooth brushing, flossing, and dental visits.  --Immunizations reviewed.  --Discussed benefits of screening colonoscopy.  --After hours service discussed with patient    2. Discussed the patient's BMI with him.            Coronary artery disease involving native coronary artery without angina pectoris, unspecified whether native or transplanted heart  stress test normal     Benign essential hypertension cont med     Morbid obesity (CMS/HCC)  weight loss 83 Ib s/p  bariatric surgery. Cont      Erectile dysfunction, cont med refill      Sleep apnea, cont cpap     Migraine cont med     Elevated blood uric acid level,  without med check lab     Osteoarthritis of both knees,follow ortho and cont med  Hypogonadism in male     Near syncope echo and event monitor and carotid duplex unremarkable, stress test normal      6 months physical          Below is to historical records for reference only:   CAD soa with exertion: stress test abnormal and cotninue ASA 81mg ,normal Cath with mild plaque, continue lipitor do lab  migraine stable now  plantar fasciitis continue mobic rest cold decompression  Morbid obesity good diet refer to weight loss center  vitD low continue vitD3 1000iu daily  Colon 7/18/19 repeat 5  years. EGD 7/18/19:  Insomnia daytime sleepy, follow up with sleep study doctor  HLD diet continue medicinef  Prediabetes, 6.1  Knee oa follow up with ortho  Ed do lab  Fatigue, do lab  Urge incont, cont  detrol LA   Answers for HPI/ROS submitted by the patient on 1/11/2022  What is the primary reason for your visit?: Other  Please describe your symptoms.: Follow up, I reckon  Have you had these symptoms before?: No  How long have you been having these symptoms?: Greater than 2 weeks

## 2022-11-09 ENCOUNTER — OFFICE VISIT (OUTPATIENT)
Dept: PULMONOLOGY | Facility: CLINIC | Age: 57
End: 2022-11-09

## 2022-11-09 VITALS
HEIGHT: 73 IN | WEIGHT: 273 LBS | SYSTOLIC BLOOD PRESSURE: 112 MMHG | BODY MASS INDEX: 36.18 KG/M2 | HEART RATE: 66 BPM | OXYGEN SATURATION: 98 % | DIASTOLIC BLOOD PRESSURE: 68 MMHG | RESPIRATION RATE: 18 BRPM

## 2022-11-09 DIAGNOSIS — Z22.7 LTBI (LATENT TUBERCULOSIS INFECTION): ICD-10-CM

## 2022-11-09 DIAGNOSIS — E66.01 MORBID OBESITY WITH BMI OF 50.0-59.9, ADULT: ICD-10-CM

## 2022-11-09 DIAGNOSIS — G47.33 OSA (OBSTRUCTIVE SLEEP APNEA): Primary | ICD-10-CM

## 2022-11-09 PROCEDURE — 99212 OFFICE O/P EST SF 10 MIN: CPT | Performed by: NURSE PRACTITIONER

## 2022-11-09 RX ORDER — MULTIVIT-MIN/IRON/FOLIC ACID/K 45-800-120
CAPSULE ORAL
COMMUNITY

## 2022-11-09 NOTE — PROGRESS NOTES
"Chief Complaint   Patient presents with   • Sleeping Problem   • Follow-up         Subjective   Willi Rayo is a 57 y.o. male.     History of Present Illness   Patient comes back today for follow up of Obstructive Sleep apnea.    Patient says that he is compliant with his device and using it regularly.    Patient's symptoms of sleep disturbance and daytime sleepiness have been helped greatly with the use of PAP device, as prescribed. He feels rested most days upon awakening.     His using FFM most of the time but uses a nasal mask some nights.       The following portions of the patient's history were reviewed and updated as appropriate: allergies, current medications, past family history, past medical history, past social history and past surgical history.    Review of Systems   HENT: Negative for rhinorrhea.    Respiratory: Negative for shortness of breath.    Psychiatric/Behavioral: Negative for sleep disturbance.       Objective   Visit Vitals  /68   Pulse 66   Resp 18   Ht 185.4 cm (73\")   Wt 124 kg (273 lb)   SpO2 98%   BMI 36.02 kg/m²         Physical Exam  Vitals reviewed.   HENT:      Head: Atraumatic.      Mouth/Throat:      Mouth: Mucous membranes are moist.      Comments: Crowded oropharynx.   Eyes:      Extraocular Movements: Extraocular movements intact.   Cardiovascular:      Rate and Rhythm: Normal rate and regular rhythm.   Pulmonary:      Effort: Pulmonary effort is normal. No respiratory distress.   Abdominal:      Comments: Obese abdomen.   Musculoskeletal:      Cervical back: Neck supple.      Comments: Gait normal.   Skin:     General: Skin is warm.   Neurological:      Mental Status: He is alert and oriented to person, place, and time.           Assessment & Plan   Diagnoses and all orders for this visit:    1. VALERIE (obstructive sleep apnea) (Primary)    2. Morbid obesity with BMI of 50.0-59.9, adult (HCC)    3. LTBI (latent tuberculosis infection)  Comments:  finished " treatment           Return in about 10 months (around 9/9/2023) for Recheck, For Dr. Main, Sleep ONLY.    DISCUSSION (if any):  I reviewed the results of last sleep study in detail. I informed him that the apnea hypopnea index was 99 / hr. This was home study, done in 2019.     He states he has lost 160 lbs since July 2021. He wonders if he still has VALERIE or if this has improved. I have told him he could have a sleep study to see if VALERIE has resolved.     I told patient that he likely continues to have VALERIE but is possible that it is not as severe.     He states he went hunting and did not snore or have issues without the machine for 1 week. He states he was not sleepy during the day either.     Continue treatment with AutoPAP at a pressure of 11/18, with a full-face mask.    He has received new cpap machine due to recall.     Patient's compliance data was reviewed and the compliance is greater than 70%.    Humidification setup, hose and mask care discussed.    Weight loss advised.    Use every night for at least 4 hours stressed.      Dictated utilizing Dragon dictation.    This document was electronically signed by KAIT Nation November 9, 2022  13:54 EST

## 2023-03-07 DIAGNOSIS — N39.41 URGE INCONTINENCE: ICD-10-CM

## 2023-03-08 DIAGNOSIS — N39.41 URGE INCONTINENCE: ICD-10-CM

## 2023-03-08 RX ORDER — TOLTERODINE 4 MG/1
4 CAPSULE, EXTENDED RELEASE ORAL DAILY
Qty: 90 CAPSULE | Refills: 3 | OUTPATIENT
Start: 2023-03-08

## 2023-03-08 RX ORDER — TOLTERODINE 4 MG/1
4 CAPSULE, EXTENDED RELEASE ORAL DAILY
Qty: 90 CAPSULE | Refills: 3 | Status: SHIPPED | OUTPATIENT
Start: 2023-03-08

## 2023-06-12 ENCOUNTER — OFFICE VISIT (OUTPATIENT)
Dept: INTERNAL MEDICINE | Facility: CLINIC | Age: 58
End: 2023-06-12
Payer: COMMERCIAL

## 2023-06-12 VITALS
SYSTOLIC BLOOD PRESSURE: 132 MMHG | OXYGEN SATURATION: 99 % | BODY MASS INDEX: 36.45 KG/M2 | DIASTOLIC BLOOD PRESSURE: 80 MMHG | WEIGHT: 275 LBS | HEIGHT: 73 IN | TEMPERATURE: 97.9 F | HEART RATE: 68 BPM

## 2023-06-12 DIAGNOSIS — M1A.9XX0 CHRONIC GOUT WITHOUT TOPHUS, UNSPECIFIED CAUSE, UNSPECIFIED SITE: ICD-10-CM

## 2023-06-12 DIAGNOSIS — G43.001 MIGRAINE WITHOUT AURA AND WITH STATUS MIGRAINOSUS, NOT INTRACTABLE: Primary | ICD-10-CM

## 2023-06-12 DIAGNOSIS — Z98.84 HISTORY OF BARIATRIC SURGERY: ICD-10-CM

## 2023-06-12 DIAGNOSIS — E66.01 MORBID OBESITY: ICD-10-CM

## 2023-06-12 DIAGNOSIS — N52.9 ERECTILE DYSFUNCTION, UNSPECIFIED ERECTILE DYSFUNCTION TYPE: ICD-10-CM

## 2023-06-12 PROBLEM — B02.9 HERPES ZOSTER WITHOUT COMPLICATION: Status: ACTIVE | Noted: 2023-06-12

## 2023-06-12 RX ORDER — CAPSAICIN 0.75 MG/G
1 CREAM TOPICAL 4 TIMES DAILY
COMMUNITY
Start: 2023-06-10

## 2023-06-12 RX ORDER — SUMATRIPTAN 50 MG/1
50 TABLET, FILM COATED ORAL ONCE AS NEEDED
Qty: 15 TABLET | Refills: 1 | Status: SHIPPED | OUTPATIENT
Start: 2023-06-12

## 2023-06-12 RX ORDER — VALACYCLOVIR HYDROCHLORIDE 1 G/1
1000 TABLET, FILM COATED ORAL 3 TIMES DAILY
COMMUNITY
Start: 2023-06-10

## 2023-06-12 RX ORDER — COLCHICINE 0.6 MG/1
TABLET ORAL
Qty: 3 TABLET | Refills: 5 | Status: CANCELLED | OUTPATIENT
Start: 2023-06-12

## 2023-06-12 RX ORDER — LIDOCAINE HCL 4% 4 G/100G
1 CREAM TOPICAL DAILY
COMMUNITY
Start: 2023-06-10

## 2023-06-12 RX ORDER — TADALAFIL 5 MG/1
5 TABLET ORAL DAILY PRN
Qty: 90 TABLET | Refills: 1 | Status: SHIPPED | OUTPATIENT
Start: 2023-06-12

## 2023-06-12 NOTE — PROGRESS NOTES
Subjective   Willi Rayo is a 57 y.o. male.     Chief Complaint   Patient presents with    Numbness     In hands and arms    Herpes Zoster          Saturday patient developed a rash in the right forearm area with burning sensation and sensitive to touch.  Patient was seen by ER physician as a friend diagnosed shingle and Valtrex started.  Patient recently had a tatoo in the right upper arm.  Recent bariatric surgery helped a weight loss.  Headache slightly more frequent after bariatric surgery  Imitrex helps     Current Outpatient Medications:     aspirin 81 MG chewable tablet, Chew 1 tablet Daily., Disp: , Rfl:     atorvastatin (LIPITOR) 40 MG tablet, TAKE 1 TABLET BY MOUTH DAILY, Disp: 90 tablet, Rfl: 3    capsaicin (ZOSTRIX) 0.075 % topical cream, Apply 1 application topically to the appropriate area as directed 4 (Four) Times a Day., Disp: , Rfl:     colchicine 0.6 MG tablet, Take 2 tablets orally, then 1 tablet 1 hour later for the onset of gout flare (Patient taking differently: As Needed. Take 2 tablets orally, then 1 tablet 1 hour later for the onset of gout flare), Disp: 3 tablet, Rfl: 5    Lidocaine HCl (LMX) 4 % cream, Apply 1 application topically to the appropriate area as directed Daily., Disp: , Rfl:     Multiple Vitamins-Minerals (Bariatric Multivitamins/Iron) capsule, Take  by mouth., Disp: , Rfl:     ondansetron (Zofran) 4 MG tablet, Take 1 tablet by mouth Every 8 (Eight) Hours As Needed for Nausea or Vomiting., Disp: 21 tablet, Rfl: 0    SUMAtriptan (IMITREX) 50 MG tablet, Take 1 tablet by mouth 1 (One) Time As Needed for Migraine for up to 30 doses., Disp: 15 tablet, Rfl: 1    tadalafil (CIALIS) 5 MG tablet, Take 1 tablet by mouth Daily As Needed for Erectile Dysfunction., Disp: 90 tablet, Rfl: 1    tolterodine LA (DETROL LA) 4 MG 24 hr capsule, Take 1 capsule by mouth Daily., Disp: 90 capsule, Rfl: 3    valACYclovir (VALTREX) 1000 MG tablet, Take 1 tablet by mouth 3 (Three) Times a Day.,  Disp: , Rfl:     The following portions of the patient's history were reviewed and updated as appropriate: allergies, current medications, past family history, past medical history, past social history, past surgical history, and problem list.    Review of Systems   Constitutional:  Negative for fatigue and fever.   HENT:  Negative for congestion, rhinorrhea and sore throat.    Eyes:  Negative for pain.   Respiratory:  Negative for cough and shortness of breath.    Gastrointestinal:  Negative for diarrhea and vomiting.   Skin:  Positive for rash.     Objective   Physical Exam  Constitutional:       Appearance: He is well-developed.   Cardiovascular:      Rate and Rhythm: Normal rate and regular rhythm.      Heart sounds: Normal heart sounds.   Pulmonary:      Effort: Pulmonary effort is normal.      Breath sounds: Normal breath sounds.   Abdominal:      General: Bowel sounds are normal.      Palpations: Abdomen is soft.   Musculoskeletal:      Cervical back: Neck supple.   Skin:     Findings: Rash present.   Neurological:      Mental Status: He is alert and oriented to person, place, and time.   Psychiatric:         Behavior: Behavior normal.       All tests have been reviewed.    Assessment & Plan   Diagnoses and all orders for this visit: She    Migraine without aura and with status migrainosus, not intractable stable on medication below continue  -     SUMAtriptan (IMITREX) 50 MG tablet; Take 1 tablet by mouth 1 (One) Time As Needed for Migraine for up to 30 doses.    Chronic gout without tophus, unspecified cause, unspecified site stable on colchicine as needed    Erectile dysfunction, unspecified erectile dysfunction type able medication below continue  -     tadalafil (CIALIS) 5 MG tablet; Take 1 tablet by mouth Daily As Needed for Erectile Dysfunction.    Morbid obesity improved after surgery    History of bariatric surgery    Herpes zoster continue Valtrex work excuse for 1 week    Other orders  -      capsaicin (ZOSTRIX) 0.075 % topical cream; Apply 1 application topically to the appropriate area as directed 4 (Four) Times a Day.  -     Lidocaine HCl (LMX) 4 % cream; Apply 1 application topically to the appropriate area as directed Daily.  -     valACYclovir (VALTREX) 1000 MG tablet; Take 1 tablet by mouth 3 (Three) Times a Day.             Answers submitted by the patient for this visit:  Primary Reason for Visit (Submitted on 6/12/2023)  What is the primary reason for your visit?: Rash

## 2023-12-02 DIAGNOSIS — N52.9 ERECTILE DYSFUNCTION, UNSPECIFIED ERECTILE DYSFUNCTION TYPE: ICD-10-CM

## 2023-12-02 RX ORDER — TADALAFIL 5 MG/1
5 TABLET ORAL DAILY PRN
Qty: 180 TABLET | Refills: 0 | Status: SHIPPED | OUTPATIENT
Start: 2023-12-02 | End: 2023-12-02 | Stop reason: SDUPTHER

## 2023-12-02 RX ORDER — TADALAFIL 5 MG/1
5 TABLET ORAL DAILY PRN
Qty: 180 TABLET | Refills: 0 | Status: SHIPPED | OUTPATIENT
Start: 2023-12-02

## 2023-12-19 ENCOUNTER — OFFICE VISIT (OUTPATIENT)
Dept: PULMONOLOGY | Facility: CLINIC | Age: 58
End: 2023-12-19
Payer: COMMERCIAL

## 2023-12-19 ENCOUNTER — OFFICE VISIT (OUTPATIENT)
Dept: INTERNAL MEDICINE | Facility: CLINIC | Age: 58
End: 2023-12-19
Payer: COMMERCIAL

## 2023-12-19 VITALS
BODY MASS INDEX: 38.59 KG/M2 | HEIGHT: 73 IN | OXYGEN SATURATION: 95 % | DIASTOLIC BLOOD PRESSURE: 78 MMHG | SYSTOLIC BLOOD PRESSURE: 130 MMHG | WEIGHT: 291.2 LBS | RESPIRATION RATE: 18 BRPM | HEART RATE: 73 BPM

## 2023-12-19 VITALS
BODY MASS INDEX: 38.57 KG/M2 | RESPIRATION RATE: 16 BRPM | HEART RATE: 83 BPM | OXYGEN SATURATION: 96 % | HEIGHT: 73 IN | DIASTOLIC BLOOD PRESSURE: 76 MMHG | WEIGHT: 291 LBS | SYSTOLIC BLOOD PRESSURE: 124 MMHG

## 2023-12-19 DIAGNOSIS — R73.03 PREDIABETES: ICD-10-CM

## 2023-12-19 DIAGNOSIS — N52.9 ERECTILE DYSFUNCTION, UNSPECIFIED ERECTILE DYSFUNCTION TYPE: ICD-10-CM

## 2023-12-19 DIAGNOSIS — I10 BENIGN ESSENTIAL HYPERTENSION: Primary | ICD-10-CM

## 2023-12-19 DIAGNOSIS — G47.30 SLEEP APNEA, UNSPECIFIED TYPE: ICD-10-CM

## 2023-12-19 DIAGNOSIS — N39.41 URGE INCONTINENCE: ICD-10-CM

## 2023-12-19 DIAGNOSIS — E66.9 OBESITY (BMI 30-39.9): ICD-10-CM

## 2023-12-19 DIAGNOSIS — E79.0 ELEVATED BLOOD URIC ACID LEVEL: ICD-10-CM

## 2023-12-19 DIAGNOSIS — M17.0 OSTEOARTHRITIS OF BOTH KNEES, UNSPECIFIED OSTEOARTHRITIS TYPE: ICD-10-CM

## 2023-12-19 DIAGNOSIS — G47.33 OSA (OBSTRUCTIVE SLEEP APNEA): Primary | ICD-10-CM

## 2023-12-19 PROCEDURE — 99214 OFFICE O/P EST MOD 30 MIN: CPT | Performed by: INTERNAL MEDICINE

## 2023-12-19 NOTE — PROGRESS NOTES
Office Note     Name: Willi Rayo    : 1965     MRN: [id number]     Chief Complaint  Annual Exam (Physical) and Establish Care (Offboarding Dr. Verduzco/)    Subjective     History of Present Illness:  Willi Rayo is a 58 y.o. male who presents today for chronic conditions    Hypertension not on medications, and stable  Hyperlipidemia on atorvastatin  Migraine headache without auras on as needed sumatriptan (last was 5-6 years ago)  ED: On as needed tadalafil  Urge incontinence: on tolterodine which provides relief  Follows with ortho for osteoarthritis, right knee for surgery on 2024  VALERIE on cpap    Colonoscopy 2019 polyps, repeat in 3 to 5 years  Non-smoker    Family History:   Family History   Problem Relation Age of Onset    Heart disease Mother     Heart disease Father     Hyperlipidemia Father     Hypertension Father     Cancer Father     Kidney disease Father     Cancer Maternal Grandmother     Stroke Maternal Grandmother     Hyperlipidemia Maternal Grandmother     Heart disease Paternal Grandfather     Colon cancer Neg Hx        Social History:   Social History     Socioeconomic History    Marital status:    Tobacco Use    Smoking status: Never    Smokeless tobacco: Never   Substance and Sexual Activity    Alcohol use: Not Currently     Comment: Upon occassion    Drug use: No    Sexual activity: Yes     Partners: Female     Birth control/protection: None, Vasectomy       Health Maintenance   Topic Date Due    ANNUAL PHYSICAL  2023    COVID-19 Vaccine (2023- season) 2024 (Originally 2023)    ZOSTER VACCINE (1 of 2) 2026 (Originally 2014)    LIPID PANEL  03/10/2024    BMI FOLLOWUP  2024    COLORECTAL CANCER SCREENING  2029    TDAP/TD VACCINES (6 - Td or Tdap) 2033    INFLUENZA VACCINE  Completed    HEPATITIS C SCREENING  Addressed    Pneumococcal Vaccine 0-64  Aged Out       Objective     Vital Signs  /76   Pulse 83   Resp 16    "Ht 185.4 cm (72.99\")   Wt 132 kg (291 lb)   SpO2 96%   BMI 38.40 kg/m²   Estimated body mass index is 38.4 kg/m² as calculated from the following:    Height as of this encounter: 185.4 cm (72.99\").    Weight as of this encounter: 132 kg (291 lb).  Class 2 Severe Obesity (BMI >=35 and <=39.9). Obesity-related health conditions include the following: dyslipidemias. Obesity is improving with lifestyle modifications. BMI is is above average; BMI management plan is completed. We discussed portion control and increasing exercise.    Physical Exam  Vitals and nursing note reviewed.   Constitutional:       Appearance: Normal appearance.   HENT:      Head: Normocephalic and atraumatic.   Cardiovascular:      Rate and Rhythm: Normal rate and regular rhythm.      Pulses: Normal pulses.      Heart sounds: Normal heart sounds.   Pulmonary:      Effort: Pulmonary effort is normal. No respiratory distress.      Breath sounds: Normal breath sounds. No wheezing, rhonchi or rales.   Abdominal:      General: Abdomen is flat. Bowel sounds are normal.      Palpations: Abdomen is soft.      Tenderness: There is no abdominal tenderness. There is no guarding.   Musculoskeletal:      Cervical back: Neck supple.   Skin:     General: Skin is warm.      Capillary Refill: Capillary refill takes less than 2 seconds.   Neurological:      General: No focal deficit present.      Mental Status: He is alert. Mental status is at baseline.   Psychiatric:         Mood and Affect: Mood normal.         Behavior: Behavior normal.          Procedures     Assessment and Plan     Diagnoses and all orders for this visit:    1. Benign essential hypertension (Primary)  Assessment & Plan:  Stable not on medications, improved with lifestyle modifications, weight loss after gastric bypass    Orders:  -     CBC & Differential  -     Comprehensive Metabolic Panel  -     Lipid Panel  -     TSH Rfx On Abnormal To Free T4    2. Prediabetes  Assessment & Plan:  On her " medications, diet controlled, significant weight loss after bariatric surgery.  Will continue to monitor A1c    Orders:  -     Hemoglobin A1c    3. Erectile dysfunction, unspecified erectile dysfunction type  Assessment & Plan:  On tadalafil as needed  Stable on current medication and dosage. Will continue current management. Refill medication as necessary.   advised not to take this with nitrates      4. Sleep apnea, unspecified type  Assessment & Plan:  Compliant with CPAP      5. Urge incontinence  Assessment & Plan:  Tolterodine  Stable on current medication and dosage. Will continue current management. Refill medication as necessary.        6. Elevated blood uric acid level  Assessment & Plan:  Previously on allopurinol but discontinued due to allergic reaction, will continue to monitor, no recent exacerbation of gout    Orders:  -     Uric Acid    7. Osteoarthritis of both knees, unspecified osteoarthritis type  Assessment & Plan:  Follows with orthopedics, for right knee surgery on 1/2024           Counseling was given to patient for the following topics: instructions for management, risks and benefits of treatment options, and importance of treatment compliance.    Follow Up  No follow-ups on file.    MD OLYA Bell [facility]  [address] [place] [id number] [phone]

## 2023-12-19 NOTE — ASSESSMENT & PLAN NOTE
Stable not on medications, improved with lifestyle modifications, weight loss after gastric bypass

## 2023-12-19 NOTE — ASSESSMENT & PLAN NOTE
On tadalafil as needed  Stable on current medication and dosage. Will continue current management. Refill medication as necessary.   advised not to take this with nitrates

## 2023-12-19 NOTE — ASSESSMENT & PLAN NOTE
Tolterodine  Stable on current medication and dosage. Will continue current management. Refill medication as necessary.

## 2023-12-19 NOTE — ASSESSMENT & PLAN NOTE
Previously on allopurinol but discontinued due to allergic reaction, will continue to monitor, no recent exacerbation of gout

## 2023-12-19 NOTE — ASSESSMENT & PLAN NOTE
On her medications, diet controlled, significant weight loss after bariatric surgery.  Will continue to monitor A1c

## 2023-12-19 NOTE — PROGRESS NOTES
"  Chief Complaint   Patient presents with    Sleeping Problem    Follow-up         Subjective   Willi Rayo is a 58 y.o. male.   Patient was evaluated today for follow up of Sleep apnea.     Patient has had some issues with his replacement CPAP device, as sometimes it \"cuts out\" and he has to unplug it and let it rest before it will start working again!.     The patient describes no significant issues with his mask either.     Patient says that the compliance with the use of the equipment is good.     Patient says that his symptoms of fatigue & daytime sleepiness have been helped greatly with the use of PAP, as prescribed.     he had a recalled device and was given a replacement device instead of the recalled device in Nov 2022.     he has lost weight after he had gastric sleeve done in April 2022.     The following portions of the patient's history were reviewed and updated as appropriate: allergies, current medications, past family history, past medical history, past social history, and past surgical history.    Review of Systems   HENT:  Negative for sinus pressure, sneezing and sore throat.    Respiratory:  Negative for cough, chest tightness, shortness of breath and wheezing.    Cardiovascular:  Negative for palpitations and leg swelling.   Psychiatric/Behavioral:  Negative for sleep disturbance.        Objective   Visit Vitals  /78   Pulse 73   Resp 18   Ht 185.4 cm (72.99\") Comment: pt reported   Wt 132 kg (291 lb 3.2 oz)   SpO2 95%   BMI 38.43 kg/m²         BMI Readings from Last 3 Encounters:   12/19/23 38.43 kg/m²   12/19/23 38.40 kg/m²   06/12/23 36.29 kg/m²       Physical Exam  Vitals reviewed.   Constitutional:       Appearance: He is well-developed.   HENT:      Head: Atraumatic.      Mouth/Throat:      Comments: Oropharynx was crowded.  Musculoskeletal:      Comments: Gait was normal.   Neurological:      Mental Status: He is alert and oriented to person, place, and time.         Assessment & " Plan   Diagnoses and all orders for this visit:    1. VALERIE (obstructive sleep apnea) (Primary)  -     BIPAP / CPAP Adjustment    2. Obesity (BMI 30-39.9)  -     BIPAP / CPAP Adjustment           Return in about 10 months (around 10/31/2024) for Dominic/Lissette.    DISCUSSION (if any):  Sleep study performed in Nov 2019  AHI was 99 / hour.   Supine AHI was 105/hour.     Latest PAP device provided in Nov 2022  DME company: [employer]    Current PAP settings: 11-18  Current mask type: Dream Wear FFM    I told the patient that his symptoms are consistent with partially controlled sleep apnea.    It appears that some of his issues are secondary to the mask.  The patient was provided with a regular full face mask prescription. We will ask the [employer] company to provide him with the same mask to use at home.     Patient was advised to continue using PAP for at least 4 hours every night.    Patient was advised to call this office with any issues.    He has lost a lot of weight (425 in Aug 2021 to 291 today)!, after gastric sleeve.     I spent a total of 31 minutes face to face with this patient. his pertinent current and previous data, as applicable, were reviewed with the patient. Patient's diagnostic studies were also reviewed, including previous sleep study. We also discussed sleep hygiene measures and measures to increase compliance with the CPAP device.  Time also includes documentation in the chart and reviewing data in the chart.       Dictated utilizing Dragon dictation.    This document was electronically signed by Celeste Main MD on 12/19/23 at 15:20 EST

## 2024-03-04 DIAGNOSIS — N39.41 URGE INCONTINENCE: ICD-10-CM

## 2024-03-04 RX ORDER — TOLTERODINE 4 MG/1
4 CAPSULE, EXTENDED RELEASE ORAL DAILY
Qty: 90 CAPSULE | Refills: 1 | Status: SHIPPED | OUTPATIENT
Start: 2024-03-04

## 2024-05-31 ENCOUNTER — TELEPHONE (OUTPATIENT)
Dept: SURGERY | Facility: CLINIC | Age: 59
End: 2024-05-31
Payer: COMMERCIAL

## 2024-05-31 RX ORDER — BISACODYL 5 MG/1
TABLET, DELAYED RELEASE ORAL
Qty: 4 TABLET | Refills: 0 | Status: SHIPPED | OUTPATIENT
Start: 2024-05-31

## 2024-05-31 RX ORDER — POLYETHYLENE GLYCOL 3350 17 G/17G
POWDER, FOR SOLUTION ORAL
Qty: 238 G | Refills: 0 | Status: SHIPPED | OUTPATIENT
Start: 2024-05-31

## 2024-05-31 NOTE — TELEPHONE ENCOUNTER
Pt would like to be scheduled at [facility] on 07/15 W/ Dr. Ventura. Verified pharmacy/insurance. Thank you.

## 2024-05-31 NOTE — TELEPHONE ENCOUNTER
PRESCREENING FOR OPEN ACCESS SCHEDULING    Wilil Rayo, 1965  [id number]    05/31/24    If, the patient answers yes to any of the following questions the provider will be informed prior to scheduling open access for approval and documented in the chart.    [x]  Yes  [] No    1. Have you ever had a colonoscopy in the past?      When:  07/18/2019      Where: [facility]      Polyps or other: Yes    []  Yes  [x] No    2. Family history of colon cancer?      Relation:       Age of onset:       Do you currently have any of the following?    []  Yes  [x] No  Rectal bleeding, if so, how long?     []  Yes  [x] No  Abdominal pain, if so, how long?    [x]  Yes  [] No  Constipation, if so, how long? Years     []  Yes  [x] No  Diarrhea, if so, how long?    []  Yes  [x] No  Weight loss, is so, how much?    [x] Yes  [] No  Small caliber stool, if so, how long? Years     []Yes  [x] No  Do you have Hemorroids?    []Yes  [x] No  Have you been diagnosed with Anemia?    []Yes  [x] No  Do you have difficulty swallowing?    []Yes  [x] No  Do you have acid reflux?    Have you ever had any of the following conditions?    [] Yes  [x] No  Heart attack?      When?       Last cardiac workup?     Blood thinners?    [] Yes  [x] No   Lung problems, asthma or COPD?  [] Yes  [x] No  Oxygen required?       [] Yes  [x] No  Stroke?     [] Yes  [x] No  Have you ever had a reaction to anesthesia?

## 2024-05-31 NOTE — TELEPHONE ENCOUNTER
Miralax prep sent to pharmacy.  Chart reviewed and ok to proceed as open access. Case requested, instructions mailed, written in OR book

## 2024-06-03 ENCOUNTER — PREP FOR SURGERY (OUTPATIENT)
Dept: OTHER | Facility: HOSPITAL | Age: 59
End: 2024-06-03
Payer: COMMERCIAL

## 2024-06-03 DIAGNOSIS — Z12.11 SCREENING FOR COLON CANCER: Primary | ICD-10-CM

## 2024-06-05 PROBLEM — Z12.11 SCREENING FOR COLON CANCER: Status: ACTIVE | Noted: 2024-06-03

## 2024-06-19 ENCOUNTER — OFFICE VISIT (OUTPATIENT)
Dept: INTERNAL MEDICINE | Facility: CLINIC | Age: 59
End: 2024-06-19
Payer: COMMERCIAL

## 2024-06-19 VITALS
HEIGHT: 73 IN | HEART RATE: 85 BPM | SYSTOLIC BLOOD PRESSURE: 118 MMHG | OXYGEN SATURATION: 95 % | RESPIRATION RATE: 16 BRPM | DIASTOLIC BLOOD PRESSURE: 82 MMHG | WEIGHT: 304 LBS | BODY MASS INDEX: 40.29 KG/M2

## 2024-06-19 DIAGNOSIS — E78.49 OTHER HYPERLIPIDEMIA: ICD-10-CM

## 2024-06-19 DIAGNOSIS — G43.909 MIGRAINE WITHOUT STATUS MIGRAINOSUS, NOT INTRACTABLE, UNSPECIFIED MIGRAINE TYPE: ICD-10-CM

## 2024-06-19 DIAGNOSIS — N39.41 URGE INCONTINENCE: ICD-10-CM

## 2024-06-19 DIAGNOSIS — I10 BENIGN ESSENTIAL HYPERTENSION: Primary | ICD-10-CM

## 2024-06-19 DIAGNOSIS — R73.03 PREDIABETES: ICD-10-CM

## 2024-06-19 DIAGNOSIS — E79.0 ELEVATED BLOOD URIC ACID LEVEL: ICD-10-CM

## 2024-06-19 DIAGNOSIS — N52.9 ERECTILE DYSFUNCTION, UNSPECIFIED ERECTILE DYSFUNCTION TYPE: ICD-10-CM

## 2024-06-19 DIAGNOSIS — G47.30 SLEEP APNEA, UNSPECIFIED TYPE: ICD-10-CM

## 2024-06-19 DIAGNOSIS — R73.9 HYPERGLYCEMIA: ICD-10-CM

## 2024-06-19 DIAGNOSIS — M17.0 OSTEOARTHRITIS OF BOTH KNEES, UNSPECIFIED OSTEOARTHRITIS TYPE: ICD-10-CM

## 2024-06-19 PROBLEM — Z12.11 SCREENING FOR COLON CANCER: Status: RESOLVED | Noted: 2024-06-03 | Resolved: 2024-06-19

## 2024-06-19 PROBLEM — E29.1 HYPOGONADISM IN MALE: Status: RESOLVED | Noted: 2021-07-19 | Resolved: 2024-06-19

## 2024-06-19 PROCEDURE — 99396 PREV VISIT EST AGE 40-64: CPT | Performed by: STUDENT IN AN ORGANIZED HEALTH CARE EDUCATION/TRAINING PROGRAM

## 2024-06-19 NOTE — ASSESSMENT & PLAN NOTE
Stable on current medication and dosage. Will continue current management. Refill medication as necessary.  Tolterodine

## 2024-06-19 NOTE — PROGRESS NOTES
Office Note     Name: Wlili Rayo    : 1965     MRN: [id number]     Chief Complaint  Annual Exam (Physical)    Subjective     History of Present Illness:  Willi Rayo is a 58 y.o. male who presents today for chronic conditions    Hypertension not on medications, and stable  Hyperlipidemia on atorvastatin  Migraine headache without auras on as needed sumatriptan (last was 5-6 years ago)  Gout as needed colchicine  ED: On as needed tadalafil  Urge incontinence: on tolterodine which provides relief  Follows with ortho for osteoarthritis, right knee for surgery on 2024  VALERIE on cpap     Colonoscopy 2019 polyps, repeat in 3 to 5 years  Non-smoker    Family History:   Family History   Problem Relation Age of Onset    Heart disease Mother     Heart disease Father     Hyperlipidemia Father     Hypertension Father     Cancer Father     Kidney disease Father     Cancer Maternal Grandmother     Stroke Maternal Grandmother     Hyperlipidemia Maternal Grandmother     Heart disease Paternal Grandfather     Colon cancer Neg Hx        Social History:   Social History     Socioeconomic History    Marital status:    Tobacco Use    Smoking status: Never    Smokeless tobacco: Never   Vaping Use    Vaping status: Never Used   Substance and Sexual Activity    Alcohol use: Not Currently     Comment: Upon occassion    Drug use: No    Sexual activity: Yes     Partners: Female     Birth control/protection: None, Vasectomy       Health Maintenance   Topic Date Due    ANNUAL PHYSICAL  2023    COVID-19 Vaccine (2023- season) 2024 (Originally 2023)    ZOSTER VACCINE (1 of 2) 2026 (Originally 2014)    INFLUENZA VACCINE  2024    BMI FOLLOWUP  2024    LIPID PANEL  03/15/2025    COLORECTAL CANCER SCREENING  2029    TDAP/TD VACCINES (6 - Td or Tdap) 2033    HEPATITIS C SCREENING  Addressed    Pneumococcal Vaccine 0-64  Aged Out       Objective     Vital Signs  BP  "118/82   Pulse 85   Resp 16   Ht 185.4 cm (72.99\")   Wt (!) 138 kg (304 lb)   SpO2 95%   BMI 40.12 kg/m²   Estimated body mass index is 40.12 kg/m² as calculated from the following:    Height as of this encounter: 185.4 cm (72.99\").    Weight as of this encounter: 138 kg (304 lb).        Physical Exam  Vitals and nursing note reviewed.   Constitutional:       Appearance: Normal appearance.   HENT:      Head: Normocephalic and atraumatic.   Cardiovascular:      Rate and Rhythm: Normal rate and regular rhythm.      Pulses: Normal pulses.      Heart sounds: Normal heart sounds.   Pulmonary:      Effort: Pulmonary effort is normal. No respiratory distress.      Breath sounds: Normal breath sounds. No wheezing, rhonchi or rales.   Abdominal:      General: Abdomen is flat. Bowel sounds are normal.      Palpations: Abdomen is soft.      Tenderness: There is no abdominal tenderness. There is no guarding.   Musculoskeletal:      Cervical back: Neck supple.   Skin:     General: Skin is warm.      Capillary Refill: Capillary refill takes less than 2 seconds.   Neurological:      General: No focal deficit present.      Mental Status: He is alert. Mental status is at baseline.   Psychiatric:         Mood and Affect: Mood normal.         Behavior: Behavior normal.          Procedures     Assessment and Plan     Diagnoses and all orders for this visit:    1. Benign essential hypertension (Primary)  Assessment & Plan:  Stable not on medicaiton    Orders:  -     CBC & Differential  -     Comprehensive Metabolic Panel  -     TSH Rfx On Abnormal To Free T4    2. Prediabetes  Assessment & Plan:  Last A1c 4.4% done in , repeat labs prior to next visit    Orders:  -     Hemoglobin A1c    3. Migraine without status migrainosus, not intractable, unspecified migraine type  Assessment & Plan:  Stable on current medication and dosage. Will continue current management. Refill medication as necessary.  As needed sumatriptan "               4. Elevated blood uric acid level  Assessment & Plan:  No recent acute flareups on as needed colchicine 0.6 mg       5. Osteoarthritis of both knees, unspecified osteoarthritis type  Assessment & Plan:  Status post knee replacement January 2024      6. Sleep apnea, unspecified type  Assessment & Plan:  On CPAP      7. Hyperglycemia    8. Other hyperlipidemia  Assessment & Plan:  Stable on current medication and dosage. Will continue current management. Refill medication as necessary.  atorvastain     Orders:  -     Lipid Panel    9. Urge incontinence  Assessment & Plan:  Stable on current medication and dosage. Will continue current management. Refill medication as necessary.  Tolterodine      10. Erectile dysfunction, unspecified erectile dysfunction type  Assessment & Plan:  Stable on current medication and dosage. Will continue current management. Refill medication as necessary.  As needed tadalafil           Counseling was given to patient for the following topics: instructions for management, risks and benefits of treatment options, and importance of treatment compliance.    Follow Up  Return in about 6 months (around 12/19/2024) for 6 month follow up.    MD OLYA Bell [facility]  [address] [place] [address] [address]  [facility] [id number] [phone]     oral

## 2024-06-19 NOTE — ASSESSMENT & PLAN NOTE
Stable on current medication and dosage. Will continue current management. Refill medication as necessary.  As needed tadalafil

## 2024-06-19 NOTE — ASSESSMENT & PLAN NOTE
Stable on current medication and dosage. Will continue current management. Refill medication as necessary.  atorvastain

## 2024-07-05 ENCOUNTER — PATIENT MESSAGE (OUTPATIENT)
Dept: INTERNAL MEDICINE | Facility: CLINIC | Age: 59
End: 2024-07-05
Payer: COMMERCIAL

## 2024-07-05 PROBLEM — Z12.11 SCREENING FOR COLON CANCER: Status: ACTIVE | Noted: 2024-06-03

## 2024-07-05 NOTE — TELEPHONE ENCOUNTER
From: Willi Rayo  To: Perla Lopez  Sent: 7/5/2024 2:30 PM EDT  Subject: Zofran    At my visit a couple weeks ago .I had ask for a Zofran prescription refill . Is it possible for you to send that in to [facility]. The original script was written by Dr Verduzco and unable to be refilled or requested.    I would appreciate it very much .    Aram

## 2024-07-08 DIAGNOSIS — R11.2 NON-INTRACTABLE VOMITING WITH NAUSEA: ICD-10-CM

## 2024-07-08 RX ORDER — POLYETHYLENE GLYCOL 3350 17 G/17G
POWDER, FOR SOLUTION ORAL
Qty: 238 G | Refills: 0 | Status: ON HOLD | OUTPATIENT
Start: 2024-07-08

## 2024-07-08 RX ORDER — ONDANSETRON 4 MG/1
4 TABLET, FILM COATED ORAL EVERY 8 HOURS PRN
Qty: 21 TABLET | Refills: 0 | Status: ON HOLD | OUTPATIENT
Start: 2024-07-08

## 2024-07-08 RX ORDER — BISACODYL 5 MG/1
TABLET, DELAYED RELEASE ORAL
Qty: 4 TABLET | Refills: 0 | Status: ON HOLD | OUTPATIENT
Start: 2024-07-08

## 2024-07-09 ENCOUNTER — TELEPHONE (OUTPATIENT)
Dept: SURGERY | Facility: CLINIC | Age: 59
End: 2024-07-09
Payer: COMMERCIAL

## 2024-07-15 ENCOUNTER — TELEPHONE (OUTPATIENT)
Dept: SURGERY | Facility: CLINIC | Age: 59
End: 2024-07-15
Payer: COMMERCIAL

## 2024-07-20 ENCOUNTER — PATIENT MESSAGE (OUTPATIENT)
Dept: INTERNAL MEDICINE | Facility: CLINIC | Age: 59
End: 2024-07-20
Payer: COMMERCIAL

## 2024-07-22 DIAGNOSIS — I25.10 CORONARY ARTERY DISEASE INVOLVING NATIVE CORONARY ARTERY WITHOUT ANGINA PECTORIS, UNSPECIFIED WHETHER NATIVE OR TRANSPLANTED HEART: ICD-10-CM

## 2024-07-22 RX ORDER — ATORVASTATIN CALCIUM 40 MG/1
40 TABLET, FILM COATED ORAL DAILY
Qty: 90 TABLET | Refills: 1 | Status: SHIPPED | OUTPATIENT
Start: 2024-07-22

## 2024-09-24 ENCOUNTER — PATIENT MESSAGE (OUTPATIENT)
Dept: INTERNAL MEDICINE | Facility: CLINIC | Age: 59
End: 2024-09-24
Payer: COMMERCIAL

## 2024-09-24 DIAGNOSIS — N39.41 URGE INCONTINENCE: ICD-10-CM

## 2024-09-24 DIAGNOSIS — I25.10 CORONARY ARTERY DISEASE INVOLVING NATIVE CORONARY ARTERY WITHOUT ANGINA PECTORIS, UNSPECIFIED WHETHER NATIVE OR TRANSPLANTED HEART: ICD-10-CM

## 2024-09-24 RX ORDER — TOLTERODINE 4 MG/1
4 CAPSULE, EXTENDED RELEASE ORAL DAILY
Qty: 90 CAPSULE | Refills: 1 | Status: CANCELLED | OUTPATIENT
Start: 2024-09-24

## 2024-09-25 RX ORDER — TOLTERODINE 4 MG/1
4 CAPSULE, EXTENDED RELEASE ORAL DAILY
Qty: 90 CAPSULE | Refills: 1 | Status: SHIPPED | OUTPATIENT
Start: 2024-09-25

## 2024-09-25 RX ORDER — ATORVASTATIN CALCIUM 40 MG/1
40 TABLET, FILM COATED ORAL DAILY
Qty: 90 TABLET | Refills: 1 | Status: SHIPPED | OUTPATIENT
Start: 2024-09-25

## 2024-10-02 ENCOUNTER — OFFICE VISIT (OUTPATIENT)
Dept: PULMONOLOGY | Facility: CLINIC | Age: 59
End: 2024-10-02
Payer: COMMERCIAL

## 2024-10-02 VITALS
HEART RATE: 83 BPM | HEIGHT: 73 IN | RESPIRATION RATE: 18 BRPM | DIASTOLIC BLOOD PRESSURE: 58 MMHG | SYSTOLIC BLOOD PRESSURE: 126 MMHG | WEIGHT: 309 LBS | OXYGEN SATURATION: 98 % | BODY MASS INDEX: 40.95 KG/M2

## 2024-10-02 DIAGNOSIS — G47.33 OSA (OBSTRUCTIVE SLEEP APNEA): Primary | ICD-10-CM

## 2024-10-02 DIAGNOSIS — E66.01 MORBID OBESITY WITH BMI OF 40.0-44.9, ADULT: ICD-10-CM

## 2024-10-02 PROCEDURE — 99212 OFFICE O/P EST SF 10 MIN: CPT | Performed by: NURSE PRACTITIONER

## 2024-10-02 RX ORDER — PLECANATIDE 3 MG/1
3 TABLET ORAL DAILY
COMMUNITY
Start: 2024-10-02 | End: 2024-12-31

## 2024-10-02 NOTE — PROGRESS NOTES
"Chief Complaint   Patient presents with    Sleeping Problem    Follow-up         Subjective   Willi Rayo is a 59 y.o. male.   Patient comes back today for follow up of Obstructive Sleep apnea.     Patient says that he is compliant with his device and using it regularly.    Patient's symptoms of sleep disturbance and daytime sleepiness have been helped greatly with the use of PAP device, as prescribed.  He feels rested most days upon awakening.     Patient states his machine is not working well. He has to push several buttons to get it to come on. He states this machine is the replacement machine sent by [employer].       The following portions of the patient's history were reviewed and updated as appropriate: allergies, current medications, past family history, past medical history, past social history, and past surgical history.      Review of Systems   HENT:  Negative for sinus pressure, sneezing and sore throat.    Respiratory:  Negative for cough, chest tightness, shortness of breath and wheezing.        Objective   Visit Vitals  /58   Pulse 83   Resp 18   Ht 185 cm (72.84\") Comment: pt reported   Wt (!) 140 kg (309 lb)   SpO2 98%   BMI 40.95 kg/m²       Physical Exam  Vitals reviewed.   Constitutional:       Appearance: He is well-developed.   HENT:      Head: Atraumatic.      Mouth/Throat:      Mouth: Mucous membranes are moist.      Comments: Crowded oropharynx.   Eyes:      Extraocular Movements: Extraocular movements intact.   Cardiovascular:      Rate and Rhythm: Normal rate and regular rhythm.   Abdominal:      Comments: Obese abdomen.    Skin:     General: Skin is warm.   Neurological:      Mental Status: He is alert and oriented to person, place, and time.         Assessment & Plan   Diagnoses and all orders for this visit:    1. VALERIE (obstructive sleep apnea) (Primary)  -     PAP Therapy    2. Morbid obesity with BMI of 40.0-44.9, adult           Return in about 5 months (around 3/2/2025) for " Recheck, For Dr. Main, Sleep ONLY.    DISCUSSION (if any):  Sleep study performed in Nov 2019  AHI was 99 / hour.   Supine AHI was 105/hour.      Latest PAP device provided in Nov 2022  DME company: [employer]     Current PAP settings: 11-18  Current mask type: Dream Wear FFM    He would like to change DME.     [facility].     Continue treatment with AutoPAP at a pressure of 11-18, with a full-face mask.    Patient's compliance data was reviewed and the compliance is greater than 70%.    Humidification setup, hose and mask care discussed.    Weight loss advised. Continue working on weight loss. He has gained some since last visit.     Use every night for at least 4 hours stressed.       Dictated utilizing Dragon dictation.    This document was electronically signed by KAIT Nation October 2, 2024  15:35 EDT

## 2025-02-05 ENCOUNTER — OFFICE VISIT (OUTPATIENT)
Dept: INTERNAL MEDICINE | Facility: CLINIC | Age: 60
End: 2025-02-05
Payer: COMMERCIAL

## 2025-02-05 VITALS
HEART RATE: 76 BPM | OXYGEN SATURATION: 95 % | TEMPERATURE: 99.2 F | RESPIRATION RATE: 16 BRPM | SYSTOLIC BLOOD PRESSURE: 114 MMHG | WEIGHT: 314 LBS | HEIGHT: 73 IN | BODY MASS INDEX: 41.62 KG/M2 | DIASTOLIC BLOOD PRESSURE: 68 MMHG

## 2025-02-05 DIAGNOSIS — R73.03 PREDIABETES: ICD-10-CM

## 2025-02-05 DIAGNOSIS — I25.10 CORONARY ARTERY DISEASE INVOLVING NATIVE CORONARY ARTERY WITHOUT ANGINA PECTORIS, UNSPECIFIED WHETHER NATIVE OR TRANSPLANTED HEART: ICD-10-CM

## 2025-02-05 DIAGNOSIS — E66.01 MORBID OBESITY: ICD-10-CM

## 2025-02-05 DIAGNOSIS — E79.0 ELEVATED BLOOD URIC ACID LEVEL: ICD-10-CM

## 2025-02-05 DIAGNOSIS — G47.30 SLEEP APNEA, UNSPECIFIED TYPE: ICD-10-CM

## 2025-02-05 DIAGNOSIS — N39.41 URGE INCONTINENCE: ICD-10-CM

## 2025-02-05 DIAGNOSIS — E78.49 OTHER HYPERLIPIDEMIA: ICD-10-CM

## 2025-02-05 DIAGNOSIS — I10 BENIGN ESSENTIAL HYPERTENSION: Primary | ICD-10-CM

## 2025-02-05 PROBLEM — Z12.11 SCREENING FOR COLON CANCER: Status: RESOLVED | Noted: 2024-06-03 | Resolved: 2025-02-05

## 2025-02-05 PROCEDURE — 99214 OFFICE O/P EST MOD 30 MIN: CPT | Performed by: STUDENT IN AN ORGANIZED HEALTH CARE EDUCATION/TRAINING PROGRAM

## 2025-02-05 RX ORDER — ATORVASTATIN CALCIUM 40 MG/1
40 TABLET, FILM COATED ORAL DAILY
Qty: 90 TABLET | Refills: 1 | Status: SHIPPED | OUTPATIENT
Start: 2025-02-05

## 2025-02-05 RX ORDER — TOLTERODINE 4 MG/1
4 CAPSULE, EXTENDED RELEASE ORAL DAILY
Qty: 90 CAPSULE | Refills: 1 | Status: SHIPPED | OUTPATIENT
Start: 2025-02-05

## 2025-02-05 NOTE — PROGRESS NOTES
Office Note     Name: Willi Rayo    : 1965     MRN: [id number]     Chief Complaint  Hypertension (6 month follow up)    Subjective     History of Present Illness:  Willi Rayo is a 59 y.o. male who presents today for chronic conditions.    Hypertension not on medications, and stable  Hyperlipidemia on atorvastatin  Migraine headache without auras on as needed sumatriptan (last was 5-6 years ago)  Gout as needed colchicine, last uric acid normal in   ED: On as needed tadalafil  Urge incontinence: on tolterodine which provides relief  Follows with ortho for osteoarthritis, right knee for surgery on 2024  VALERIE on cpap     Colonoscopy 10/2024 polyps  Non-smoker    Family History:   Family History   Problem Relation Age of Onset    Heart disease Mother     Heart disease Father     Hyperlipidemia Father     Hypertension Father     Cancer Father     Kidney disease Father     Cancer Maternal Grandmother     Stroke Maternal Grandmother     Hyperlipidemia Maternal Grandmother     Heart disease Paternal Grandfather     Colon cancer Neg Hx        Social History:   Social History     Socioeconomic History    Marital status:    Tobacco Use    Smoking status: Never    Smokeless tobacco: Never   Vaping Use    Vaping status: Never Used   Substance and Sexual Activity    Alcohol use: Yes     Comment: Upon occassion    Drug use: No    Sexual activity: Yes     Partners: Female     Birth control/protection: None       Health Maintenance   Topic Date Due    ANNUAL PHYSICAL  2023    COVID-19 Vaccine (2024- season) 2026 (Originally 2024)    ZOSTER VACCINE (1 of 2) 2026 (Originally 2014)    LIPID PANEL  10/22/2025    BMI FOLLOWUP  2026    TDAP/TD VACCINES (6 - Td or Tdap) 2033    COLORECTAL CANCER SCREENING  10/24/2034    INFLUENZA VACCINE  Completed    HEPATITIS C SCREENING  Addressed    Pneumococcal Vaccine 0-64  Aged Out       Patient Care Team:  John  "MD Perla as PCP - General (Family Medicine)  Sharon Ventura MD as Consulting Physician (General Surgery)    Objective     Vital Signs  /68   Pulse 76   Temp 99.2 °F (37.3 °C) (Infrared)   Resp 16   Ht 185 cm (72.84\")   Wt (!) 142 kg (314 lb)   SpO2 95%   BMI 41.62 kg/m²   Estimated body mass index is 41.62 kg/m² as calculated from the following:    Height as of this encounter: 185 cm (72.84\").    Weight as of this encounter: 142 kg (314 lb).      Physical Exam  Vitals and nursing note reviewed.   Constitutional:       Appearance: Normal appearance.   HENT:      Head: Normocephalic and atraumatic.   Cardiovascular:      Rate and Rhythm: Normal rate and regular rhythm.      Pulses: Normal pulses.      Heart sounds: Normal heart sounds.   Pulmonary:      Effort: Pulmonary effort is normal. No respiratory distress.      Breath sounds: Normal breath sounds. No wheezing, rhonchi or rales.   Abdominal:      General: Abdomen is flat. Bowel sounds are normal.      Palpations: Abdomen is soft.      Tenderness: There is no abdominal tenderness. There is no guarding.   Musculoskeletal:      Cervical back: Neck supple.   Skin:     General: Skin is warm.      Capillary Refill: Capillary refill takes less than 2 seconds.   Neurological:      General: No focal deficit present.      Mental Status: He is alert. Mental status is at baseline.   Psychiatric:         Mood and Affect: Mood normal.         Behavior: Behavior normal.          Procedures     Assessment and Plan     Diagnoses and all orders for this visit:    1. Benign essential hypertension (Primary)  Assessment & Plan:  Stable not on medicaiton    Orders:  -     CBC (No Diff)  -     Basic Metabolic Panel    2. Urge incontinence  Assessment & Plan:  Stable on current medication and dosage. Will continue current management. Refill medication as necessary.  Tolterodine    Orders:  -     tolterodine LA (DETROL LA) 4 MG 24 hr capsule; Take 1 capsule by mouth " Daily.  Dispense: 90 capsule; Refill: 1    3. Coronary artery disease involving native coronary artery without angina pectoris, unspecified whether native or transplanted heart  Assessment & Plan:  stable on statin, continue aspirin 81mg daily    Orders:  -     atorvastatin (LIPITOR) 40 MG tablet; Take 1 tablet by mouth Daily.  Dispense: 90 tablet; Refill: 1  -     Lipid Panel    4. Prediabetes  Assessment & Plan:  Have labs done, not on medications    Orders:  -     Hemoglobin A1c    5. Sleep apnea, unspecified type  Assessment & Plan:  On CPAP continue      6. Elevated blood uric acid level  Assessment & Plan:  No recent acute flareups on as needed colchicine 0.6 mg   Recheck uric acid level    Orders:  -     Uric Acid    7. Other hyperlipidemia  Assessment & Plan:  Stable on current medication and dosage. Will continue current management. Refill medication as necessary.  atorvastain     Orders:  -     TSH Rfx On Abnormal To Free T4    8. Morbid obesity  Assessment & Plan:  Patient's (Body mass index is 41.62 kg/m².) indicates that they are morbidly/severely obese (BMI > 40 or > 35 with obesity - related health condition) with health conditions that include hypertension and coronary heart disease . Weight is unchanged. BMI  is above average; BMI management plan is completed. We discussed portion control and increasing exercise.            Counseling was given to patient for the following topics: instructions for management, risks and benefits of treatment options, and importance of treatment compliance.    Follow Up  Return in about 6 months (around 8/5/2025) for Annual.    MD OLYA Bell MR  [facility]  [address] [place] [address] [address]  [facility] [id number] [phone]

## 2025-02-05 NOTE — ASSESSMENT & PLAN NOTE
Stable on current medication and dosage. Will continue current management. Refill medication as necessary.  Tolterodine   No

## 2025-02-05 NOTE — ASSESSMENT & PLAN NOTE
Patient's (Body mass index is 41.62 kg/m².) indicates that they are morbidly/severely obese (BMI > 40 or > 35 with obesity - related health condition) with health conditions that include hypertension and coronary heart disease . Weight is unchanged. BMI  is above average; BMI management plan is completed. We discussed portion control and increasing exercise.

## 2025-03-04 ENCOUNTER — OFFICE VISIT (OUTPATIENT)
Dept: PULMONOLOGY | Facility: CLINIC | Age: 60
End: 2025-03-04
Payer: COMMERCIAL

## 2025-03-04 VITALS
SYSTOLIC BLOOD PRESSURE: 128 MMHG | HEART RATE: 82 BPM | RESPIRATION RATE: 16 BRPM | OXYGEN SATURATION: 97 % | DIASTOLIC BLOOD PRESSURE: 82 MMHG | BODY MASS INDEX: 42.66 KG/M2 | HEIGHT: 72 IN | WEIGHT: 315 LBS

## 2025-03-04 DIAGNOSIS — G47.33 OSA (OBSTRUCTIVE SLEEP APNEA): Primary | ICD-10-CM

## 2025-03-04 PROCEDURE — 99213 OFFICE O/P EST LOW 20 MIN: CPT | Performed by: INTERNAL MEDICINE

## 2025-03-04 NOTE — PROGRESS NOTES
"  Chief Complaint   Patient presents with   • Sleeping Problem   • Follow-up         Subjective   Willi Rayo is a 59 y.o. male.   Patient was evaluated today for follow up of Sleep apnea.     Patient doesn't report any issues with the PAP device. The patient describes no significant issues with his mask either.     Patient says that the compliance with the use of the equipment is good.     Patient says that his symptoms of fatigue & daytime sleepiness have been helped greatly with the use of PAP, as prescribed.       The following portions of the patient's history were reviewed and updated as appropriate: allergies, current medications, past family history, past medical history, past social history, and past surgical history.    Review of Systems   HENT:  Negative for sinus pressure, sneezing and sore throat.    Respiratory:  Negative for cough, chest tightness, shortness of breath and wheezing.        Objective   Visit Vitals  /82   Pulse 82   Resp 16   Ht 182.9 cm (72\") Comment: pt reported   Wt (!) 144 kg (318 lb)   SpO2 97%   BMI 43.13 kg/m²       BMI Readings from Last 8 Encounters:   03/04/25 43.13 kg/m²   02/05/25 41.62 kg/m²   10/02/24 40.95 kg/m²   07/08/24 39.58 kg/m²   06/19/24 40.12 kg/m²   12/19/23 38.43 kg/m²   12/19/23 38.40 kg/m²   06/12/23 36.29 kg/m²       Physical Exam  Vitals reviewed.   Constitutional:       Appearance: He is well-developed.   HENT:      Head: Atraumatic.      Mouth/Throat:      Comments: Oropharynx was crowded.  Musculoskeletal:      Comments: Gait was normal.   Neurological:      Mental Status: He is alert and oriented to person, place, and time.           Assessment & Plan   Diagnoses and all orders for this visit:    1. VALERIE (obstructive sleep apnea) (Primary)  -     BIPAP / CPAP Adjustment    2. BMI 40.0-44.9, adult  -     BIPAP / CPAP Adjustment           Return in about 15 months (around 5/20/2026) for Sleep[facility]/Lissette.    DISCUSSION (if any):  Sleep study " performed in Nov 2019  AHI was 99 / hour.   Supine AHI was 105/hour.     Latest PAP device provided in Nov 2024  DME company: [email or web address]    Current PAP settings: 11-18  Current mask type: FFM    Compliance data was obtained from the his device/DME company.    Continue PAP device on a regular basis, at least 4 hours or more per night.    Sleep hygiene measures were discussed    Weight loss advised.    I spent a total of 21 minutes face to face with this patient. Part of this time was spent in counseling patient about the pathophysiology of underlying disease process, reviewing any available sleep studies, need to use devices (as applicable) on a regular basis and lifestyle modifications that may positively impact patient's health.  Time also includes documentation in electronic health records      Dictated utilizing Dragon dictation.    This document was electronically signed by Celeste Main MD on 03/04/25 at 14:40 EST   Yes

## 2025-04-09 DIAGNOSIS — N52.9 ERECTILE DYSFUNCTION, UNSPECIFIED ERECTILE DYSFUNCTION TYPE: ICD-10-CM

## 2025-04-09 RX ORDER — TADALAFIL 5 MG/1
5 TABLET ORAL DAILY PRN
Qty: 90 TABLET | Refills: 0 | Status: SHIPPED | OUTPATIENT
Start: 2025-04-09

## (undated) DEVICE — JELLY,LUBE,STERILE,FLIP TOP,TUBE,2-OZ: Brand: MEDLINE

## (undated) DEVICE — CONMED SCOPE SAVER BITE BLOCK, 20X27 MM: Brand: SCOPE SAVER

## (undated) DEVICE — FRCP BIOP RADLJAW4 HOT 2.2X240 BX40

## (undated) DEVICE — SYR LUER SLPTP 50ML

## (undated) DEVICE — Device

## (undated) DEVICE — GLV SURG TRIUMPH MICRO PF LTX 7.5 STRL

## (undated) DEVICE — PAD GRND REM POLYHESIVE A/ DISP

## (undated) DEVICE — TP SXN YANKR BULB STRL

## (undated) DEVICE — FRCP BIOP COLD ENDOJAW ALLGTR W/NDL 2.8X2300MM BLU

## (undated) DEVICE — Device: Brand: DEFENDO AIR/WATER/SUCTION AND BIOPSY VALVE

## (undated) DEVICE — ENDOSCOPY PORT CONNECTOR FOR OLYMPUS® SCOPES: Brand: ERBE

## (undated) DEVICE — MEDI-VAC NON-CONDUCTIVE SUCTION TUBING: Brand: CARDINAL HEALTH

## (undated) DEVICE — HYBRID TUBING/CAP SET FOR OLYMPUS® SCOPES: Brand: ERBE

## (undated) DEVICE — SNAR POLYP SENSATION STDOVL 27 240 BX40

## (undated) DEVICE — GLV SURG SENSICARE W/ALOE PF LF 7.5 STRL